# Patient Record
Sex: FEMALE | Race: WHITE | NOT HISPANIC OR LATINO
[De-identification: names, ages, dates, MRNs, and addresses within clinical notes are randomized per-mention and may not be internally consistent; named-entity substitution may affect disease eponyms.]

---

## 2017-04-10 PROBLEM — Z00.00 ENCOUNTER FOR PREVENTIVE HEALTH EXAMINATION: Status: ACTIVE | Noted: 2017-04-10

## 2017-05-02 ENCOUNTER — APPOINTMENT (OUTPATIENT)
Dept: GASTROENTEROLOGY | Facility: CLINIC | Age: 52
End: 2017-05-02

## 2017-05-02 VITALS
BODY MASS INDEX: 38.65 KG/M2 | TEMPERATURE: 97.9 F | DIASTOLIC BLOOD PRESSURE: 100 MMHG | SYSTOLIC BLOOD PRESSURE: 140 MMHG | HEIGHT: 70 IN | WEIGHT: 270 LBS

## 2017-05-02 DIAGNOSIS — K30 FUNCTIONAL DYSPEPSIA: ICD-10-CM

## 2017-05-02 DIAGNOSIS — E78.00 PURE HYPERCHOLESTEROLEMIA, UNSPECIFIED: ICD-10-CM

## 2017-05-02 DIAGNOSIS — Z86.39 PERSONAL HISTORY OF OTHER ENDOCRINE, NUTRITIONAL AND METABOLIC DISEASE: ICD-10-CM

## 2017-05-02 DIAGNOSIS — K92.0 HEMATEMESIS: ICD-10-CM

## 2017-05-02 DIAGNOSIS — Z82.3 FAMILY HISTORY OF STROKE: ICD-10-CM

## 2017-05-02 DIAGNOSIS — Z87.09 PERSONAL HISTORY OF OTHER DISEASES OF THE RESPIRATORY SYSTEM: ICD-10-CM

## 2017-05-02 DIAGNOSIS — R11.10 VOMITING, UNSPECIFIED: ICD-10-CM

## 2017-05-02 DIAGNOSIS — Z86.79 PERSONAL HISTORY OF OTHER DISEASES OF THE CIRCULATORY SYSTEM: ICD-10-CM

## 2017-05-02 DIAGNOSIS — F15.90 OTHER STIMULANT USE, UNSPECIFIED, UNCOMPLICATED: ICD-10-CM

## 2017-05-02 DIAGNOSIS — Z82.49 FAMILY HISTORY OF ISCHEMIC HEART DISEASE AND OTHER DISEASES OF THE CIRCULATORY SYSTEM: ICD-10-CM

## 2017-07-14 ENCOUNTER — APPOINTMENT (OUTPATIENT)
Dept: GASTROENTEROLOGY | Facility: AMBULATORY MEDICAL SERVICES | Age: 52
End: 2017-07-14

## 2017-07-28 ENCOUNTER — APPOINTMENT (OUTPATIENT)
Dept: GASTROENTEROLOGY | Facility: AMBULATORY MEDICAL SERVICES | Age: 52
End: 2017-07-28
Payer: COMMERCIAL

## 2017-07-28 ENCOUNTER — OTHER (OUTPATIENT)
Age: 52
End: 2017-07-28

## 2017-07-28 PROCEDURE — 43239 EGD BIOPSY SINGLE/MULTIPLE: CPT | Mod: 59

## 2017-07-28 PROCEDURE — G0121 COLON CA SCRN NOT HI RSK IND: CPT

## 2017-08-08 ENCOUNTER — OTHER (OUTPATIENT)
Age: 52
End: 2017-08-08

## 2017-08-11 ENCOUNTER — APPOINTMENT (OUTPATIENT)
Dept: GASTROENTEROLOGY | Facility: CLINIC | Age: 52
End: 2017-08-11
Payer: COMMERCIAL

## 2017-08-29 ENCOUNTER — APPOINTMENT (OUTPATIENT)
Dept: GASTROENTEROLOGY | Facility: CLINIC | Age: 52
End: 2017-08-29
Payer: COMMERCIAL

## 2017-08-29 VITALS
HEIGHT: 70 IN | WEIGHT: 260 LBS | DIASTOLIC BLOOD PRESSURE: 95 MMHG | SYSTOLIC BLOOD PRESSURE: 130 MMHG | BODY MASS INDEX: 37.22 KG/M2 | TEMPERATURE: 98.1 F

## 2017-08-29 PROCEDURE — 99213 OFFICE O/P EST LOW 20 MIN: CPT

## 2017-10-27 ENCOUNTER — RX RENEWAL (OUTPATIENT)
Age: 52
End: 2017-10-27

## 2017-10-29 ENCOUNTER — RX RENEWAL (OUTPATIENT)
Age: 52
End: 2017-10-29

## 2017-11-16 ENCOUNTER — APPOINTMENT (OUTPATIENT)
Dept: GASTROENTEROLOGY | Facility: AMBULATORY MEDICAL SERVICES | Age: 52
End: 2017-11-16
Payer: COMMERCIAL

## 2017-11-16 PROCEDURE — 43239 EGD BIOPSY SINGLE/MULTIPLE: CPT

## 2017-12-05 ENCOUNTER — APPOINTMENT (OUTPATIENT)
Dept: GASTROENTEROLOGY | Facility: CLINIC | Age: 52
End: 2017-12-05

## 2017-12-11 ENCOUNTER — RX RENEWAL (OUTPATIENT)
Age: 52
End: 2017-12-11

## 2017-12-18 ENCOUNTER — APPOINTMENT (OUTPATIENT)
Dept: GASTROENTEROLOGY | Facility: CLINIC | Age: 52
End: 2017-12-18
Payer: COMMERCIAL

## 2017-12-18 VITALS
TEMPERATURE: 98.3 F | DIASTOLIC BLOOD PRESSURE: 100 MMHG | HEIGHT: 70 IN | BODY MASS INDEX: 41.95 KG/M2 | WEIGHT: 293 LBS | SYSTOLIC BLOOD PRESSURE: 140 MMHG

## 2017-12-18 DIAGNOSIS — Z71.89 OTHER SPECIFIED COUNSELING: ICD-10-CM

## 2017-12-18 PROCEDURE — 99214 OFFICE O/P EST MOD 30 MIN: CPT

## 2017-12-29 ENCOUNTER — RX RENEWAL (OUTPATIENT)
Age: 52
End: 2017-12-29

## 2018-01-10 ENCOUNTER — RX RENEWAL (OUTPATIENT)
Age: 53
End: 2018-01-10

## 2018-04-03 ENCOUNTER — APPOINTMENT (OUTPATIENT)
Dept: ULTRASOUND IMAGING | Facility: CLINIC | Age: 53
End: 2018-04-03

## 2018-04-03 ENCOUNTER — OUTPATIENT (OUTPATIENT)
Dept: OUTPATIENT SERVICES | Facility: HOSPITAL | Age: 53
LOS: 1 days | End: 2018-04-03
Payer: COMMERCIAL

## 2018-04-03 DIAGNOSIS — Z00.8 ENCOUNTER FOR OTHER GENERAL EXAMINATION: ICD-10-CM

## 2018-04-03 PROCEDURE — 20611 DRAIN/INJ JOINT/BURSA W/US: CPT

## 2018-04-03 PROCEDURE — 20611 DRAIN/INJ JOINT/BURSA W/US: CPT | Mod: LT

## 2018-04-12 ENCOUNTER — APPOINTMENT (OUTPATIENT)
Dept: GASTROENTEROLOGY | Facility: CLINIC | Age: 53
End: 2018-04-12
Payer: COMMERCIAL

## 2018-04-12 VITALS
TEMPERATURE: 98 F | HEIGHT: 70 IN | WEIGHT: 256 LBS | BODY MASS INDEX: 36.65 KG/M2 | SYSTOLIC BLOOD PRESSURE: 130 MMHG | DIASTOLIC BLOOD PRESSURE: 100 MMHG

## 2018-04-12 DIAGNOSIS — R12 HEARTBURN: ICD-10-CM

## 2018-04-12 DIAGNOSIS — Z09 ENCOUNTER FOR FOLLOW-UP EXAMINATION AFTER COMPLETED TREATMENT FOR CONDITIONS OTHER THAN MALIGNANT NEOPLASM: ICD-10-CM

## 2018-04-12 PROCEDURE — 99213 OFFICE O/P EST LOW 20 MIN: CPT

## 2018-06-18 ENCOUNTER — RX RENEWAL (OUTPATIENT)
Age: 53
End: 2018-06-18

## 2019-01-07 ENCOUNTER — RX RENEWAL (OUTPATIENT)
Age: 54
End: 2019-01-07

## 2019-09-02 PROBLEM — Z09 FOLLOW UP: Status: ACTIVE | Noted: 2018-04-12

## 2019-09-24 ENCOUNTER — APPOINTMENT (OUTPATIENT)
Dept: GASTROENTEROLOGY | Facility: CLINIC | Age: 54
End: 2019-09-24
Payer: COMMERCIAL

## 2019-09-24 VITALS
OXYGEN SATURATION: 98 % | WEIGHT: 280 LBS | HEIGHT: 70 IN | TEMPERATURE: 98.2 F | SYSTOLIC BLOOD PRESSURE: 130 MMHG | HEART RATE: 84 BPM | BODY MASS INDEX: 40.09 KG/M2 | DIASTOLIC BLOOD PRESSURE: 90 MMHG

## 2019-09-24 DIAGNOSIS — K21.0 DIAPHRAGMATIC HERNIA W/OUT OBSTRUCTION OR GANGRENE: ICD-10-CM

## 2019-09-24 DIAGNOSIS — Z87.19 PERSONAL HISTORY OF OTHER DISEASES OF THE DIGESTIVE SYSTEM: ICD-10-CM

## 2019-09-24 DIAGNOSIS — E66.9 OBESITY, UNSPECIFIED: ICD-10-CM

## 2019-09-24 DIAGNOSIS — R93.41 ABNORMAL RADIOLOGIC FINDINGS ON DIAGNOSTIC IMAGING OF OF RENAL PELVIS, URETER, OR BLADDER: ICD-10-CM

## 2019-09-24 DIAGNOSIS — N39.0 URINARY TRACT INFECTION, SITE NOT SPECIFIED: ICD-10-CM

## 2019-09-24 DIAGNOSIS — K44.9 DIAPHRAGMATIC HERNIA W/OUT OBSTRUCTION OR GANGRENE: ICD-10-CM

## 2019-09-24 DIAGNOSIS — K21.9 GASTRO-ESOPHAGEAL REFLUX DISEASE W/OUT ESOPHAGITIS: ICD-10-CM

## 2019-09-24 PROCEDURE — 99214 OFFICE O/P EST MOD 30 MIN: CPT

## 2019-09-24 NOTE — PHYSICAL EXAM
[General Appearance - Alert] : alert [General Appearance - In No Acute Distress] : in no acute distress [Sclera] : the sclera and conjunctiva were normal [PERRL With Normal Accommodation] : pupils were equal in size, round, and reactive to light [Extraocular Movements] : extraocular movements were intact [Outer Ear] : the ears and nose were normal in appearance [Oropharynx] : the oropharynx was normal [Neck Appearance] : the appearance of the neck was normal [Neck Cervical Mass (___cm)] : no neck mass was observed [Thyroid Diffuse Enlargement] : the thyroid was not enlarged [Jugular Venous Distention Increased] : there was no jugular-venous distention [Thyroid Nodule] : there were no palpable thyroid nodules [Auscultation Breath Sounds / Voice Sounds] : lungs were clear to auscultation bilaterally [Heart Sounds] : normal S1 and S2 [Heart Rate And Rhythm] : heart rate was normal and rhythm regular [Heart Sounds Gallop] : no gallops [Bowel Sounds] : normal bowel sounds [Heart Sounds Pericardial Friction Rub] : no pericardial rub [Murmurs] : no murmurs [Abdomen Tenderness] : non-tender [Abdomen Soft] : soft [] : no hepato-splenomegaly [No CVA Tenderness] : no ~M costovertebral angle tenderness [Abdomen Mass (___ Cm)] : no abdominal mass palpated [Abnormal Walk] : normal gait [No Spinal Tenderness] : no spinal tenderness [Nail Clubbing] : no clubbing  or cyanosis of the fingernails [Motor Tone] : muscle strength and tone were normal [Musculoskeletal - Swelling] : no joint swelling seen [Impaired Insight] : insight and judgment were intact [Oriented To Time, Place, And Person] : oriented to person, place, and time [Affect] : the affect was normal

## 2019-09-24 NOTE — HISTORY OF PRESENT ILLNESS
[FreeTextEntry1] : Patient is a 54-year-old female with history of GERD. She had an upper endoscopy in 2017 that revealed a hiatus hernia and patulous LES. She had regained the weight she had lost. She regained about 50 pounds and her GERD symptoms recurred. She is having heartburn and regurgitation. This is occurring postprandially and when she is lying down. This is associated with occasional vomiting. She took over-the-counter Nexium with some relief of her symptoms but only for a short period of time. \par Patient had recurrent UTIs at the end of June. Abdominal sonogram revealed a possible kidney mass. This was not confirmed by CAT scan but she did have evidence of a in the bladder. She denies any instrumentation. She finished a course of Macrobid today and feels better. She had a colonoscopy in 2017 that revealed multiple diverticula in the sigmoid colon.

## 2019-09-24 NOTE — ASSESSMENT
[FreeTextEntry1] : Patient with recurrent GERD symptoms. She does have a history of a hiatus hernia, patulous LES, and esophagitis. She will continue famotidine 40 mg b.i.d. She has recently gained weight and will try and lose.\par She also had an abnormal finding on CT scan with small amount of air in the urinary bladder. She does have a history of diverticulosis but no symptoms of diverticulitis. A urinalysis will be ordered.

## 2019-11-12 ENCOUNTER — APPOINTMENT (OUTPATIENT)
Dept: GASTROENTEROLOGY | Facility: CLINIC | Age: 54
End: 2019-11-12

## 2019-12-12 ENCOUNTER — RX RENEWAL (OUTPATIENT)
Age: 54
End: 2019-12-12

## 2019-12-15 ENCOUNTER — RX RENEWAL (OUTPATIENT)
Age: 54
End: 2019-12-15

## 2019-12-15 RX ORDER — FAMOTIDINE 40 MG/1
40 TABLET, FILM COATED ORAL
Qty: 60 | Refills: 0 | Status: ACTIVE | COMMUNITY
Start: 2017-10-29 | End: 1900-01-01

## 2020-03-24 ENCOUNTER — EMERGENCY (EMERGENCY)
Facility: HOSPITAL | Age: 55
LOS: 0 days | Discharge: HOME | End: 2020-03-25
Attending: EMERGENCY MEDICINE
Payer: COMMERCIAL

## 2020-03-24 VITALS
HEART RATE: 85 BPM | RESPIRATION RATE: 22 BRPM | HEIGHT: 70 IN | SYSTOLIC BLOOD PRESSURE: 159 MMHG | WEIGHT: 274.92 LBS | TEMPERATURE: 98 F | DIASTOLIC BLOOD PRESSURE: 93 MMHG

## 2020-03-24 DIAGNOSIS — R06.02 SHORTNESS OF BREATH: ICD-10-CM

## 2020-03-24 DIAGNOSIS — J18.9 PNEUMONIA, UNSPECIFIED ORGANISM: ICD-10-CM

## 2020-03-24 DIAGNOSIS — J45.41 MODERATE PERSISTENT ASTHMA WITH (ACUTE) EXACERBATION: ICD-10-CM

## 2020-03-24 DIAGNOSIS — B34.9 VIRAL INFECTION, UNSPECIFIED: ICD-10-CM

## 2020-03-24 LAB
FLU A RESULT: SIGNIFICANT CHANGE UP
FLU A RESULT: SIGNIFICANT CHANGE UP
FLUAV AG NPH QL: SIGNIFICANT CHANGE UP
FLUBV AG NPH QL: SIGNIFICANT CHANGE UP
RSV RESULT: SIGNIFICANT CHANGE UP
RSV RNA RESP QL NAA+PROBE: SIGNIFICANT CHANGE UP

## 2020-03-24 PROCEDURE — 99284 EMERGENCY DEPT VISIT MOD MDM: CPT

## 2020-03-24 PROCEDURE — 93010 ELECTROCARDIOGRAM REPORT: CPT

## 2020-03-24 RX ORDER — ALBUTEROL 90 UG/1
1 AEROSOL, METERED ORAL ONCE
Refills: 0 | Status: COMPLETED | OUTPATIENT
Start: 2020-03-24 | End: 2020-03-24

## 2020-03-24 RX ORDER — IPRATROPIUM/ALBUTEROL SULFATE 18-103MCG
3 AEROSOL WITH ADAPTER (GRAM) INHALATION
Refills: 0 | Status: DISCONTINUED | OUTPATIENT
Start: 2020-03-24 | End: 2020-03-24

## 2020-03-24 RX ADMIN — Medication 100 MILLIGRAM(S): at 22:13

## 2020-03-24 RX ADMIN — Medication 60 MILLIGRAM(S): at 22:13

## 2020-03-24 RX ADMIN — ALBUTEROL 1 PUFF(S): 90 AEROSOL, METERED ORAL at 22:13

## 2020-03-24 NOTE — ED ADULT TRIAGE NOTE - CHIEF COMPLAINT QUOTE
hx of asthma PW with SOB  since Thursday given albuterol had a  - chest x ray by Urgent care Saturday, however reports inhaler ineffective. Additionally pt reports several coworkers tested + COVID -19. denies recent travels, chest pain., chills and fever.

## 2020-03-24 NOTE — ED PROVIDER NOTE - OBJECTIVE STATEMENT
55 yo F with 1 week of sob, went to PCP, diagnosed with viral syndrome/asthma exacerbation, + covid + contacts.  + low grade fever, cough, sob at rest, no cp.  No other complaints or inciting event or associated symptoms.   ROS: negative for headache, chest pain, shortness of breath, abd pain, nausea, vomiting, diarrhea, rash, paresthesia, and focal weakness--all other systems reviewed are negative.   PMH: asthma; Meds: albuterol prn; SH: Denies smoking/drinking/drug use

## 2020-03-24 NOTE — ED PROVIDER NOTE - CARE PLAN
Principal Discharge DX:	Moderate persistent asthma with exacerbation  Secondary Diagnosis:	Viral syndrome Principal Discharge DX:	Moderate persistent asthma with exacerbation  Secondary Diagnosis:	Viral syndrome  Secondary Diagnosis:	Pneumonia of left lower lobe due to infectious organism

## 2020-03-24 NOTE — ED PROVIDER NOTE - PHYSICAL EXAMINATION
Vitals: HTN at 159/93, otherwise WNL  Gen: AAOx3, NAD, sitting comfortably in chair, non-toxic   Head: ncat, perrla, eomi b/l  Neck: supple, no lymphadenopathy, no midline deviation  Heart: rrr, no m/r/g  Lungs: mild expiratory wheezing diffusely   Abd: soft, nontender, non-distended, no rebound or guarding  Ext: no clubbing/cyanosis/edema  Neuro: sensation and muscle strength intact b/l, steady gait

## 2020-03-24 NOTE — ED PROVIDER NOTE - PATIENT PORTAL LINK FT
You can access the FollowMyHealth Patient Portal offered by [facility] by registering at the following website: [email or web address]. By joining [employer]’s FollowMyHealth portal, you will also be able to view your health information using other applications (apps) compatible with our system.

## 2020-03-24 NOTE — ED PROVIDER NOTE - CARE PROVIDER_API CALL
Beck Schroeder)  Medicine  [address], [address]  [place] [address]  Phone: [phone]  Fax: [phone]  Follow Up Time: 4-6 Days

## 2020-03-24 NOTE — ED ADULT NURSE NOTE - OBJECTIVE STATEMENT
53 yo F with 1 week of sob, went to PCP, diagnosed with viral syndrome/asthma exacerbation, + covid + contacts.  + low grade fever, cough, sob at rest, no cp.  No other complaints or inciting event or associated symptoms.

## 2020-03-24 NOTE — ED PROVIDER NOTE - PROGRESS NOTE DETAILS
Results reported to patient--grossly benign, labs unremarkable, XR shows ? LLL infiltrate  Pt. reports feeling better after meds, sob improved   pt. agrees to f/u with primary care outpt. asap, referred to pulm for f/u   pt. understands to return to ED if symptoms worsen; will d/c with meds

## 2020-03-24 NOTE — ED PROVIDER NOTE - CLINICAL SUMMARY MEDICAL DECISION MAKING FREE TEXT BOX
53 yo F with sob, likely asthma incited by viral syndrome, possible covid  -flu/rvp, cxr, ekg, prednisone, combinebs, tessalon for cough  -f/u results, reeval

## 2020-03-25 VITALS
OXYGEN SATURATION: 97 % | RESPIRATION RATE: 18 BRPM | TEMPERATURE: 97 F | SYSTOLIC BLOOD PRESSURE: 154 MMHG | HEART RATE: 85 BPM | DIASTOLIC BLOOD PRESSURE: 95 MMHG

## 2020-03-25 PROCEDURE — 71045 X-RAY EXAM CHEST 1 VIEW: CPT | Mod: 26

## 2020-03-25 RX ORDER — ACETAMINOPHEN 500 MG
2 TABLET ORAL
Qty: 40 | Refills: 0
Start: 2020-03-25 | End: 2020-03-29

## 2020-03-25 RX ORDER — IBUPROFEN 200 MG
1 TABLET ORAL
Qty: 20 | Refills: 0
Start: 2020-03-25 | End: 2020-03-29

## 2020-03-25 RX ORDER — AZITHROMYCIN 500 MG/1
1 TABLET, FILM COATED ORAL
Qty: 4 | Refills: 0
Start: 2020-03-25 | End: 2020-03-28

## 2020-03-25 RX ORDER — ALBUTEROL 90 UG/1
2 AEROSOL, METERED ORAL
Qty: 1 | Refills: 0
Start: 2020-03-25 | End: 2020-04-23

## 2020-03-27 LAB — RAPID RVP RESULT: SIGNIFICANT CHANGE UP

## 2021-07-17 ENCOUNTER — EMERGENCY (EMERGENCY)
Facility: HOSPITAL | Age: 56
LOS: 0 days | Discharge: LEFT BEFORE DISCHARGE | End: 2021-07-17
Attending: EMERGENCY MEDICINE
Payer: COMMERCIAL

## 2021-07-17 ENCOUNTER — INPATIENT (INPATIENT)
Facility: HOSPITAL | Age: 56
LOS: 5 days | Discharge: HOME | End: 2021-07-23
Attending: INTERNAL MEDICINE | Admitting: INTERNAL MEDICINE
Payer: COMMERCIAL

## 2021-07-17 VITALS
SYSTOLIC BLOOD PRESSURE: 162 MMHG | TEMPERATURE: 98 F | OXYGEN SATURATION: 100 % | WEIGHT: 261.91 LBS | DIASTOLIC BLOOD PRESSURE: 84 MMHG | RESPIRATION RATE: 17 BRPM | HEART RATE: 90 BPM | HEIGHT: 70 IN

## 2021-07-17 VITALS
RESPIRATION RATE: 18 BRPM | TEMPERATURE: 98 F | OXYGEN SATURATION: 100 % | HEART RATE: 84 BPM | DIASTOLIC BLOOD PRESSURE: 81 MMHG | SYSTOLIC BLOOD PRESSURE: 125 MMHG

## 2021-07-17 VITALS
HEIGHT: 70 IN | TEMPERATURE: 98 F | RESPIRATION RATE: 17 BRPM | SYSTOLIC BLOOD PRESSURE: 156 MMHG | OXYGEN SATURATION: 99 % | HEART RATE: 114 BPM | WEIGHT: 214.95 LBS | DIASTOLIC BLOOD PRESSURE: 84 MMHG

## 2021-07-17 DIAGNOSIS — I63.9 CEREBRAL INFARCTION, UNSPECIFIED: ICD-10-CM

## 2021-07-17 DIAGNOSIS — G43.909 MIGRAINE, UNSPECIFIED, NOT INTRACTABLE, WITHOUT STATUS MIGRAINOSUS: ICD-10-CM

## 2021-07-17 LAB
ALBUMIN SERPL ELPH-MCNC: 3.6 G/DL — SIGNIFICANT CHANGE UP (ref 3.3–5)
ALP SERPL-CCNC: 86 U/L — SIGNIFICANT CHANGE UP (ref 40–120)
ALT FLD-CCNC: 39 U/L — SIGNIFICANT CHANGE UP (ref 12–78)
ANION GAP SERPL CALC-SCNC: 9 MMOL/L — SIGNIFICANT CHANGE UP (ref 5–17)
APTT BLD: 30.7 SEC — SIGNIFICANT CHANGE UP (ref 27.5–35.5)
AST SERPL-CCNC: 15 U/L — SIGNIFICANT CHANGE UP (ref 15–37)
BASOPHILS # BLD AUTO: 0.02 K/UL — SIGNIFICANT CHANGE UP (ref 0–0.2)
BASOPHILS NFR BLD AUTO: 0.3 % — SIGNIFICANT CHANGE UP (ref 0–2)
BILIRUB SERPL-MCNC: 0.3 MG/DL — SIGNIFICANT CHANGE UP (ref 0.2–1.2)
BUN SERPL-MCNC: 19 MG/DL — SIGNIFICANT CHANGE UP (ref 7–23)
CALCIUM SERPL-MCNC: 9.7 MG/DL — SIGNIFICANT CHANGE UP (ref 8.5–10.1)
CHLORIDE SERPL-SCNC: 106 MMOL/L — SIGNIFICANT CHANGE UP (ref 96–108)
CO2 SERPL-SCNC: 23 MMOL/L — SIGNIFICANT CHANGE UP (ref 22–31)
CREAT SERPL-MCNC: 1.08 MG/DL — SIGNIFICANT CHANGE UP (ref 0.5–1.3)
EOSINOPHIL # BLD AUTO: 0.03 K/UL — SIGNIFICANT CHANGE UP (ref 0–0.5)
EOSINOPHIL NFR BLD AUTO: 0.5 % — SIGNIFICANT CHANGE UP (ref 0–6)
FLUAV AG NPH QL: SIGNIFICANT CHANGE UP
FLUBV AG NPH QL: SIGNIFICANT CHANGE UP
GLUCOSE BLDC GLUCOMTR-MCNC: 106 MG/DL — HIGH (ref 70–99)
GLUCOSE SERPL-MCNC: 99 MG/DL — SIGNIFICANT CHANGE UP (ref 70–99)
HCG SERPL-ACNC: 5 MIU/ML — SIGNIFICANT CHANGE UP
HCT VFR BLD CALC: 37.7 % — SIGNIFICANT CHANGE UP (ref 34.5–45)
HGB BLD-MCNC: 12.2 G/DL — SIGNIFICANT CHANGE UP (ref 11.5–15.5)
IMM GRANULOCYTES NFR BLD AUTO: 0.3 % — SIGNIFICANT CHANGE UP (ref 0–1.5)
INR BLD: 1.02 RATIO — SIGNIFICANT CHANGE UP (ref 0.88–1.16)
LYMPHOCYTES # BLD AUTO: 1.86 K/UL — SIGNIFICANT CHANGE UP (ref 1–3.3)
LYMPHOCYTES # BLD AUTO: 28.5 % — SIGNIFICANT CHANGE UP (ref 13–44)
MCHC RBC-ENTMCNC: 27.7 PG — SIGNIFICANT CHANGE UP (ref 27–34)
MCHC RBC-ENTMCNC: 32.4 GM/DL — SIGNIFICANT CHANGE UP (ref 32–36)
MCV RBC AUTO: 85.5 FL — SIGNIFICANT CHANGE UP (ref 80–100)
MONOCYTES # BLD AUTO: 0.47 K/UL — SIGNIFICANT CHANGE UP (ref 0–0.9)
MONOCYTES NFR BLD AUTO: 7.2 % — SIGNIFICANT CHANGE UP (ref 2–14)
NEUTROPHILS # BLD AUTO: 4.12 K/UL — SIGNIFICANT CHANGE UP (ref 1.8–7.4)
NEUTROPHILS NFR BLD AUTO: 63.2 % — SIGNIFICANT CHANGE UP (ref 43–77)
NRBC # BLD: 0 /100 WBCS — SIGNIFICANT CHANGE UP (ref 0–0)
NT-PROBNP SERPL-SCNC: 70 PG/ML — SIGNIFICANT CHANGE UP (ref 0–125)
PLATELET # BLD AUTO: 290 K/UL — SIGNIFICANT CHANGE UP (ref 150–400)
POTASSIUM SERPL-MCNC: 4.1 MMOL/L — SIGNIFICANT CHANGE UP (ref 3.5–5.3)
POTASSIUM SERPL-SCNC: 4.1 MMOL/L — SIGNIFICANT CHANGE UP (ref 3.5–5.3)
PROT SERPL-MCNC: 8.5 GM/DL — HIGH (ref 6–8.3)
PROTHROM AB SERPL-ACNC: 11.8 SEC — SIGNIFICANT CHANGE UP (ref 10.6–13.6)
RBC # BLD: 4.41 M/UL — SIGNIFICANT CHANGE UP (ref 3.8–5.2)
RBC # FLD: 13.4 % — SIGNIFICANT CHANGE UP (ref 10.3–14.5)
SARS-COV-2 RNA SPEC QL NAA+PROBE: SIGNIFICANT CHANGE UP
SODIUM SERPL-SCNC: 138 MMOL/L — SIGNIFICANT CHANGE UP (ref 135–145)
WBC # BLD: 6.52 K/UL — SIGNIFICANT CHANGE UP (ref 3.8–10.5)
WBC # FLD AUTO: 6.52 K/UL — SIGNIFICANT CHANGE UP (ref 3.8–10.5)

## 2021-07-17 PROCEDURE — 70450 CT HEAD/BRAIN W/O DYE: CPT | Mod: 26,MA

## 2021-07-17 PROCEDURE — 99285 EMERGENCY DEPT VISIT HI MDM: CPT

## 2021-07-17 PROCEDURE — 71045 X-RAY EXAM CHEST 1 VIEW: CPT | Mod: 26

## 2021-07-17 PROCEDURE — 99222 1ST HOSP IP/OBS MODERATE 55: CPT

## 2021-07-17 PROCEDURE — 99284 EMERGENCY DEPT VISIT MOD MDM: CPT

## 2021-07-17 PROCEDURE — 70486 CT MAXILLOFACIAL W/O DYE: CPT | Mod: 26,MA

## 2021-07-17 PROCEDURE — 93010 ELECTROCARDIOGRAM REPORT: CPT

## 2021-07-17 RX ORDER — HEPARIN SODIUM 5000 [USP'U]/ML
5000 INJECTION INTRAVENOUS; SUBCUTANEOUS EVERY 12 HOURS
Refills: 0 | Status: DISCONTINUED | OUTPATIENT
Start: 2021-07-17 | End: 2021-07-22

## 2021-07-17 RX ORDER — SODIUM CHLORIDE 9 MG/ML
3 INJECTION INTRAMUSCULAR; INTRAVENOUS; SUBCUTANEOUS ONCE
Refills: 0 | Status: COMPLETED | OUTPATIENT
Start: 2021-07-17 | End: 2021-07-17

## 2021-07-17 RX ORDER — DIPHENHYDRAMINE HCL 50 MG
25 CAPSULE ORAL ONCE
Refills: 0 | Status: COMPLETED | OUTPATIENT
Start: 2021-07-17 | End: 2021-07-17

## 2021-07-17 RX ORDER — ACETAMINOPHEN 500 MG
650 TABLET ORAL EVERY 6 HOURS
Refills: 0 | Status: DISCONTINUED | OUTPATIENT
Start: 2021-07-17 | End: 2021-07-21

## 2021-07-17 RX ORDER — ATORVASTATIN CALCIUM 80 MG/1
80 TABLET, FILM COATED ORAL AT BEDTIME
Refills: 0 | Status: DISCONTINUED | OUTPATIENT
Start: 2021-07-17 | End: 2021-07-23

## 2021-07-17 RX ORDER — ASPIRIN/CALCIUM CARB/MAGNESIUM 324 MG
325 TABLET ORAL ONCE
Refills: 0 | Status: COMPLETED | OUTPATIENT
Start: 2021-07-17 | End: 2021-07-17

## 2021-07-17 RX ORDER — ASPIRIN/CALCIUM CARB/MAGNESIUM 324 MG
81 TABLET ORAL DAILY
Refills: 0 | Status: DISCONTINUED | OUTPATIENT
Start: 2021-07-17 | End: 2021-07-23

## 2021-07-17 RX ORDER — METOCLOPRAMIDE HCL 10 MG
10 TABLET ORAL ONCE
Refills: 0 | Status: COMPLETED | OUTPATIENT
Start: 2021-07-17 | End: 2021-07-17

## 2021-07-17 RX ORDER — ACETAMINOPHEN 500 MG
975 TABLET ORAL ONCE
Refills: 0 | Status: COMPLETED | OUTPATIENT
Start: 2021-07-17 | End: 2021-07-17

## 2021-07-17 RX ADMIN — Medication 10 MILLIGRAM(S): at 15:49

## 2021-07-17 RX ADMIN — Medication 25 MILLIGRAM(S): at 22:07

## 2021-07-17 RX ADMIN — Medication 975 MILLIGRAM(S): at 16:46

## 2021-07-17 RX ADMIN — Medication 975 MILLIGRAM(S): at 15:49

## 2021-07-17 RX ADMIN — Medication 325 MILLIGRAM(S): at 22:07

## 2021-07-17 RX ADMIN — Medication 10 MILLIGRAM(S): at 22:07

## 2021-07-17 RX ADMIN — SODIUM CHLORIDE 3 MILLILITER(S): 9 INJECTION INTRAMUSCULAR; INTRAVENOUS; SUBCUTANEOUS at 22:16

## 2021-07-17 NOTE — H&P ADULT - NSHPLABSRESULTS_GEN_ALL_CORE
LABS:                        12.2   6.52  )-----------( 290      ( 17 Jul 2021 16:48 )             37.7     07-17    138  |  106  |  19  ----------------------------<  99  4.1   |  23  |  1.08    Ca    9.7      17 Jul 2021 16:48    TPro  8.5<H>  /  Alb  3.6  /  TBili  0.3  /  DBili  x   /  AST  15  /  ALT  39  /  AlkPhos  86  07-17    PT/INR - ( 17 Jul 2021 16:48 )   PT: 11.8 sec;   INR: 1.02 ratio         PTT - ( 17 Jul 2021 16:48 )  PTT:30.7 sec

## 2021-07-17 NOTE — ED PROVIDER NOTE - NEUROLOGICAL, MLM
Alert and oriented, no focal deficits, no motor or sensory deficits. no dysmetria, normal finger to nose

## 2021-07-17 NOTE — ED PROVIDER NOTE - PATIENT PORTAL LINK FT
You can access the FollowMyHealth Patient Portal offered by [facility] by registering at the following website: [email or web address]. By joining [employer]’s FollowMyHealth portal, you will also be able to view your health information using other applications (apps) compatible with our system.

## 2021-07-17 NOTE — H&P ADULT - ASSESSMENT
57 y/o F     with PMH migraines,               Pt  c/o  right  occipital and rt temporal headache x 6 days, and noted she had trouble findings her words.        Pt reports the trouble findings words improved, but still present mildly.        Pt was seen earlier this morning  by Er  and CT ,showed  Lt temporal and occipital CVA and pt signed out AMA.       pt now returns  to er  for admission         *  s/p headaches/  word  finding  difficulty        Ct head, acute to  sub acute  infarct, in left temporal lobe/ left  occipital  lobe          Neuro   called by  er          on asa/  lipitor 80 mg           Echo ordered        MRI brain ordered         on dvt ppx/ s/q  heparin         labs/ lipid  profile  in am     ra< from: CT Head No Cont (07.17.21 @ 16:21) >  MPRESSION:  HEAD CT: Acute or subacute infarct suspected along mid to posterior left temporal lobe and possibly also upper left occipital lobe.  Discussed with Dr. Sauceda in the ED at 4:34 PM. MRI may provide helpful additional evaluation, if clinically indicated.  --- End of Report ---  < end of copied text >            	57 y/o F     with PMH migraines,               Pt  c/o  right  occipital and rt temporal headache x 6 days, and noted she had trouble findings her words.        Pt reports the trouble findings words improved, but still present mildly.        Pt was seen earlier this morning  by Er  and CT ,showed  Lt temporal and occipital CVA and pt signed out AMA.       pt now returns  to er  for admission         *  s/p headaches/  word  finding  difficulty        Ct head, acute to  sub acute  infarct, in left temporal lobe/ left  occipital  lobe          Neuro   called by  er          on asa/  lipitor 80 mg             Echo ordered           MRI brain ordered    *   bmi  is  37             on dvt ppx/ s/q  heparin         labs/ lipid  profile  in am     ra< from: CT Head No Cont (07.17.21 @ 16:21) >  MPRESSION:  HEAD CT: Acute or subacute infarct suspected along mid to posterior left temporal lobe and possibly also upper left occipital lobe.  Discussed with Dr. Sauceda in the ED at 4:34 PM. MRI may provide helpful additional evaluation, if clinically indicated.  --- End of Report ---  < end of copied text >            	57 y/o F     with PMH migraines,               Pt  c/o  right  occipital and rt temporal headache x 6 days, and noted she had trouble findings her words.        Pt reports the trouble findings words improved, but still present mildly.        Pt was seen earlier this morning  by Er  and CT ,showed  Lt temporal and occipital CVA and pt signed out AMA., to  take  care of  he r pets/ 9  cats/  1  dog       pt now returns  to er  for admission         *  s/p headaches/  word  finding  difficulty, all have  resolbed        Ct head, acute to  sub acute  infarct, in left temporal lobe/ left  occipital  lobe          Neuro   called by  er          on asa/  lipitor 80 mg             Echo ordered           MRI brain ordered    *   bmi  is  37             on dvt ppx/ s/q  heparin         labs/ lipid  profile  in am     ra< from: CT Head No Cont (07.17.21 @ 16:21) >  MPRESSION:  HEAD CT: Acute or subacute infarct suspected along mid to posterior left temporal lobe and possibly also upper left occipital lobe.  Discussed with Dr. Sauceda in the ED at 4:34 PM. MRI may provide helpful additional evaluation, if clinically indicated.  --- End of Report ---  < end of copied text >

## 2021-07-17 NOTE — ED ADULT NURSE NOTE - OBJECTIVE STATEMENT
received er bed 1 c/o migraine headache since last sunday with nausea vomited x 1 on monday states feels difficulty finding right words at times since monday a&ox3 denies dizziness denies any focal numbness or weakness noted no facial asymmetry speech clear moves all extremities with = strength and coordination denies dizziness denies photosensitivity c/o sinus pain/pressure and congestion

## 2021-07-17 NOTE — H&P ADULT - NSHPPHYSICALEXAM_GEN_ALL_CORE
PHYSICAL EXAMINATION:  Vital Signs Last 24 Hrs  T(C): 36.7 (17 Jul 2021 20:44), Max: 36.7 (17 Jul 2021 12:36)  T(F): 98 (17 Jul 2021 20:44), Max: 98.1 (17 Jul 2021 12:36)  HR: 90 (17 Jul 2021 20:44) (84 - 114)  BP: 162/84 (17 Jul 2021 20:44) (125/81 - 162/84)  BP(mean): --  RR: 17 (17 Jul 2021 20:44) (17 - 18)  SpO2: 100% (17 Jul 2021 20:44) (99% - 100%)  CAPILLARY BLOOD GLUCOSE      POCT Blood Glucose.: 106 mg/dL (17 Jul 2021 20:49)        GENERAL: NAD, well-groomed,  HEAD:  atraumatic, normocephalic  EYES: sclera anicteric  ENMT: mucous membranes moist  NECK: supple, No JVD  CHEST/LUNG: clear to auscultation bilaterally;    no      rales   ,   no rhonchi,   HEART: normal S1, S2  ABDOMEN: BS+, soft, ND, NT   EXTREMITIES:    no    edema    b/l LEs  NEURO: awake, ,     moves all extremities    no  focal deficits  SKIN: no     rash PHYSICAL EXAMINATION:  Vital Signs Last 24 Hrs  T(C): 36.7 (17 Jul 2021 20:44), Max: 36.7 (17 Jul 2021 12:36)  T(F): 98 (17 Jul 2021 20:44), Max: 98.1 (17 Jul 2021 12:36)  HR: 90 (17 Jul 2021 20:44) (84 - 114)  BP: 162/84 (17 Jul 2021 20:44) (125/81 - 162/84)  BP(mean): --  RR: 17 (17 Jul 2021 20:44) (17 - 18)  SpO2: 100% (17 Jul 2021 20:44) (99% - 100%)  CAPILLARY BLOOD GLUCOSE      POCT Blood Glucose.: 106 mg/dL (17 Jul 2021 20:49)        GENERAL: NAD, well-groomed,  HEAD:  atraumatic, normocephalic  EYES: sclera anicteric  ENMT: mucous membranes moist  NECK: supple, No JVD  CHEST/LUNG: clear to auscultation bilaterally;    no      rales   ,   no rhonchi,   HEART: normal S1, S2  ABDOMEN: BS+, soft, ND, NT   EXTREMITIES:    no    edema    b/l LEs  NEURO: awake, ,     moves all extremities    no  focal deficits/  speech  is  normal now  SKIN: no     rash

## 2021-07-17 NOTE — ED PROVIDER NOTE - CLINICAL SUMMARY MEDICAL DECISION MAKING FREE TEXT BOX
ddx: complex migraine no risk factors for CVA nih stroke scale 0, r/o SAH  plan: ct head, tylenol, reglan, & reassess

## 2021-07-17 NOTE — ED PROVIDER NOTE - OBJECTIVE STATEMENT
Pertinent PMH/PSH/FHx/SHx and Review of Systems contained within:     55 y/o F with PMHx of presents to the ED c/o migraine since yesterday with episodes of emesis. Pt was seen earlier today by Dr. Sauceda and was to be admitted but left AMA to "take care of some things." Pertinent PMH/PSH/FHx/SHx and Review of Systems contained within:     57 y/o F with PMH migraines, presents for admission for subacute CVA. Pt reports had Rt occipital and rt temporal headache x 6 days, and noted she had trouble findings her words. Pt reports the trouble findings words improved, but still present mildly. HA still present. Pt was seen earlier this morning and CT ntoed with Lt temporal and occipital CVA and signed out AMA.     No fever/chills, No photophobia/eye pain/changes in vision, No ear pain/sore throat/dysphagia, No chest pain/palpitations, no SOB/cough, no wheeze/stridor, No abdominal pain, No N/V/D, no dysuria/frequency/discharge, No neck/back pain, no rash, +HA/aphasia

## 2021-07-17 NOTE — ED ADULT NURSE NOTE - OBJECTIVE STATEMENT
pt was admitted to ED today and went AMA> pt came back due to increased headache. pt states she had headache since saturday. denies cp, dizziness, confusion, weakness.

## 2021-07-17 NOTE — ED ADULT TRIAGE NOTE - CHIEF COMPLAINT QUOTE
55 y/o female with PMH of Migraine. Presents to the ED with c/c of left sided headache & around the eye ongoing for the past 7 days, 1 episode of vomiting, with new onset of mild amnesia. pt denies any head trauma

## 2021-07-17 NOTE — ED ADULT NURSE NOTE - CHIEF COMPLAINT QUOTE
57 y/o female with PMH of Migraine. Presents to the ED with c/c of left sided headache & around the eye ongoing for the past 7 days, 1 episode of vomiting, with new onset of mild amnesia. pt denies any head trauma

## 2021-07-17 NOTE — ED PROVIDER NOTE - PHYSICAL EXAMINATION
Gen: Alert, Well appearing. NAD    Head: NC, AT, PERRL, normal lids/conjunctiva   ENT: patent oropharynx without erythema/exudate, uvula midline  Neck: supple, no tenderness/meningismus  Pulm: Bilateral clear BS, normal resp effort  CV: RRR, no M/R/G, +dist pulses   Abd: soft, NT/ND, +BS, no guarding/rebound tenderness  Mskel: no edema/erythema/cyanosis   Skin: no rash, no bruising  Neuro: AAOx3, no sensory/motor deficits,

## 2021-07-17 NOTE — H&P ADULT - HISTORY OF PRESENT ILLNESS
57 y/o F     with PMH migraines,               Pt  c/o  right  occipital and rt temporal headache x 6 days, and noted she had trouble findings her words.        Pt reports the trouble findings words improved, but still present mildly.        Pt was seen earlier this morning and CT ,  Lt temporal and occipital CVA and signed out AMA.     	No fever/chills, No photophobia/eye pain/changes in vision,       No chest pain/palpitations, no SOB/cough, no wheeze/stridor, No abdominal pain, No N/V/D   	55 y/o F     with PMH migraines,               Pt  c/o  right  occipital and rt temporal headache x 6 days, and noted she had trouble findings her words.        Pt reports the trouble findings words improved        Pt was seen earlier this morning and CT ,  Lt temporal and occipital CVA and signed out AMA., a s she  had  to take  care  of  her  petrs        No fever/chills, No photophobia/eye pain/changes in vision,       No chest pain/palpitations, no SOB/cough, no wheeze/stridor, No abdominal pain, No N/V/D

## 2021-07-17 NOTE — ED PROVIDER NOTE - PROGRESS NOTE DETAILS
Pt has CT showing signs of subacute stroke. Pt informed of results and recommended to stay for admission and MRI. 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 Pt agrees not to drive, and to use [employer]. Pt understands risks, and has capacity to leave.

## 2021-07-17 NOTE — ED PROVIDER NOTE - OBJECTIVE STATEMENT
55 y/o pmhx migraines presents with c/o right sided headache since yesterday with reported vomiting episodes. pt states since Monday she hasn't been able to piece her words together properly, since then she states it has gotten somewhat better. No aggravating factors, rates pain 5/10 and pain feels similar to prior migraines.

## 2021-07-18 DIAGNOSIS — R79.89 OTHER SPECIFIED ABNORMAL FINDINGS OF BLOOD CHEMISTRY: ICD-10-CM

## 2021-07-18 PROBLEM — G43.909 MIGRAINE, UNSPECIFIED, NOT INTRACTABLE, WITHOUT STATUS MIGRAINOSUS: Chronic | Status: ACTIVE | Noted: 2021-07-17

## 2021-07-18 LAB
ANION GAP SERPL CALC-SCNC: 7 MMOL/L — SIGNIFICANT CHANGE UP (ref 5–17)
BUN SERPL-MCNC: 18 MG/DL — SIGNIFICANT CHANGE UP (ref 7–23)
CALCIUM SERPL-MCNC: 9.1 MG/DL — SIGNIFICANT CHANGE UP (ref 8.5–10.1)
CHLORIDE SERPL-SCNC: 106 MMOL/L — SIGNIFICANT CHANGE UP (ref 96–108)
CHOLEST SERPL-MCNC: 254 MG/DL — HIGH
CO2 SERPL-SCNC: 25 MMOL/L — SIGNIFICANT CHANGE UP (ref 22–31)
CREAT SERPL-MCNC: 1.07 MG/DL — SIGNIFICANT CHANGE UP (ref 0.5–1.3)
GLUCOSE SERPL-MCNC: 95 MG/DL — SIGNIFICANT CHANGE UP (ref 70–99)
HCT VFR BLD CALC: 38.5 % — SIGNIFICANT CHANGE UP (ref 34.5–45)
HDLC SERPL-MCNC: 46 MG/DL — LOW
HGB BLD-MCNC: 12.2 G/DL — SIGNIFICANT CHANGE UP (ref 11.5–15.5)
LIPID PNL WITH DIRECT LDL SERPL: 180 MG/DL — HIGH
MCHC RBC-ENTMCNC: 27.1 PG — SIGNIFICANT CHANGE UP (ref 27–34)
MCHC RBC-ENTMCNC: 31.7 GM/DL — LOW (ref 32–36)
MCV RBC AUTO: 85.4 FL — SIGNIFICANT CHANGE UP (ref 80–100)
NON HDL CHOLESTEROL: 208 MG/DL — HIGH
NRBC # BLD: 0 /100 WBCS — SIGNIFICANT CHANGE UP (ref 0–0)
PLATELET # BLD AUTO: 280 K/UL — SIGNIFICANT CHANGE UP (ref 150–400)
POTASSIUM SERPL-MCNC: 3.8 MMOL/L — SIGNIFICANT CHANGE UP (ref 3.5–5.3)
POTASSIUM SERPL-SCNC: 3.8 MMOL/L — SIGNIFICANT CHANGE UP (ref 3.5–5.3)
RBC # BLD: 4.51 M/UL — SIGNIFICANT CHANGE UP (ref 3.8–5.2)
RBC # FLD: 13.7 % — SIGNIFICANT CHANGE UP (ref 10.3–14.5)
SODIUM SERPL-SCNC: 138 MMOL/L — SIGNIFICANT CHANGE UP (ref 135–145)
TRIGL SERPL-MCNC: 139 MG/DL — SIGNIFICANT CHANGE UP
TSH SERPL-MCNC: <0.005 UIU/ML — LOW (ref 0.36–3.74)
WBC # BLD: 4.27 K/UL — SIGNIFICANT CHANGE UP (ref 3.8–10.5)
WBC # FLD AUTO: 4.27 K/UL — SIGNIFICANT CHANGE UP (ref 3.8–10.5)

## 2021-07-18 PROCEDURE — 99254 IP/OBS CNSLTJ NEW/EST MOD 60: CPT

## 2021-07-18 RX ADMIN — ATORVASTATIN CALCIUM 80 MILLIGRAM(S): 80 TABLET, FILM COATED ORAL at 21:06

## 2021-07-18 RX ADMIN — Medication 650 MILLIGRAM(S): at 21:05

## 2021-07-18 RX ADMIN — Medication 650 MILLIGRAM(S): at 21:36

## 2021-07-18 RX ADMIN — HEPARIN SODIUM 5000 UNIT(S): 5000 INJECTION INTRAVENOUS; SUBCUTANEOUS at 17:54

## 2021-07-18 RX ADMIN — Medication 81 MILLIGRAM(S): at 11:14

## 2021-07-18 RX ADMIN — HEPARIN SODIUM 5000 UNIT(S): 5000 INJECTION INTRAVENOUS; SUBCUTANEOUS at 05:25

## 2021-07-18 NOTE — CONSULT NOTE ADULT - PROBLEM SELECTOR RECOMMENDATION 9
check free thyroxine, total T3, thyroid antibodies including TSI  Further management based on lab findings  Patient does not have any signs and symptoms of hyperthyroidism and could have subclinical level low for overactive thyroid on  Currently no indication of her thyroid sonogram or scan.  Thank You for the courtesy of this consultation !!!

## 2021-07-18 NOTE — CONSULT NOTE ADULT - SUBJECTIVE AND OBJECTIVE BOX
NEUROLOGY CONSULT    HPI:  57 yo with a history of migraine headaches which occur 3-4 times per year, and typically last 2-3 days.  She came to the ER because of a severe unremitting migraine headache x 6 days associated with word finding difficulty.  Today headache is improved, about 1-2/10 in severity.  Patient admitted because of abnormal head CT with hypodensity seen in the left temporo-occipital region, possible acute/subacute ischemic stroke.    PMHx: HTN, HLD    EKG: NSR    NEUROLOGICAL EXAM:  T 98.2 F  /68  HR 81  MS: Awake, alert, oriented x 4, language fluent, comprehension intact, naming intact, repetition intact, normal affect  CN: PERRLA, EOMI, visual fields intact, facial sensation intact, face symmetric, hearing intact, no dysarthria, symmetric palatal elevation, tongue midline, symmetric shoulder shrug and SCM strength  Motor: normal bulk, normal tone, power 5/5 in all four extremities, no tremors or fasciculations  Sensation: intact to light touch, vibration sense, proprioception  Reflexes: 2 at biceps, brachioradialis, patella, ankle bilaterally.  Flexor plantar response bilaterally.  No clonus  Coordination: no dysmetria  Gait: normal    NIHSS = 0    Assessment:  57 yo woman with resolving migraine headache.  Abnormal head CT with hypodensity in left temporo-occipital region, possible acute/subacute ischemic stroke.  Need MRI to confirm is acute stroke is present and rule out alternative pathologies, as patient's clinical presentation and exam are atypical for acute stroke.  Stroke risk factors include HTN, HLD.    Recommendations:  1. MRI brain  2. Aspirin 81mg PO daily  3. Statin therapy: Atorvastatin 80mg daily, target LDL < 70  4. Check lipid profile, Hgb A1C, ESR, CRP, CELIO, hypercoaguable panel  5. If stroke confirmed on brain MRI, obtain TTE  6. Cardica telemetry monitoring    Ford Harden MD  [facility] Department of Neurology  Ep[facility]

## 2021-07-18 NOTE — CONSULT NOTE ADULT - SUBJECTIVE AND OBJECTIVE BOX
Patient is a 56y old  Female who presents with a chief complaint of cva (18 Jul 2021 07:44)      Reason For Consult: suppressed TSH    HPI:    	57 y/o F     with PMH migraines,               Pt  c/o  right  occipital and rt temporal headache x 6 days, and noted she had trouble findings her words.        Pt reports the trouble findings words improved        Pt was seen earlier this morning and CT ,  Lt temporal and occipital CVA and signed out AMA., a s she  had  to take  care  of  her  petrs        No fever/chills, No photophobia/eye pain/changes in vision,       No chest pain/palpitations, no SOB/cough, no wheeze/stridor, No abdominal pain, No N/V/D (17 Jul 2021 22:20)  patient was told in the past she may have Hashimoto's thyroiditis.  She has never been treated with any levothyroxine or antidiabetic medications.  She comes in with headache and has been found to have TSH of < 0.005.  middle of the thyroid function tests are not available.  She denies any weight loss, tremors tachycardia increased sweating on any other signs and symptoms of hyperthyroidism    PAST MEDICAL & SURGICAL HISTORY:  Migraines    No significant past surgical history        FAMILY HISTORY:        Social History:    MEDICATIONS  (STANDING):  aspirin enteric coated 81 milliGRAM(s) Oral daily  atorvastatin 80 milliGRAM(s) Oral at bedtime  heparin   Injectable 5000 Unit(s) SubCutaneous every 12 hours    MEDICATIONS  (PRN):  acetaminophen   Tablet .. 650 milliGRAM(s) Oral every 6 hours PRN Mild Pain (1 - 3)      REVIEW OF SYSTEMS:  CONSTITUTIONAL:  as per HPI  HEENT:  Eyes:  No diplopia or blurred vision. ENT:  No earache, sore throat or runny nose.  CARDIOVASCULAR:  No pressure, squeezing, strangling, tightness, heaviness or aching about the chest, neck, axilla or epigastrium.  RESPIRATORY:  No cough, shortness of breath, PND or orthopnea.  GASTROINTESTINAL:  No nausea, vomiting or diarrhea.  GENITOURINARY:  No dysuria, frequency or urgency. No Blood in urine  MUSCULOSKELETAL:  no joint aches, no muscle pain, myalgia  SKIN:  No change in skin, hair or nails.  NEUROLOGIC:  No paresthesias, fasciculations, seizures or weakness.  PSYCHIATRIC:  No disorder of thought or mood.  ENDOCRINE:  No heat or cold intolerance, polyuria or polydipsia. abnormal weight gain or loss, oral thrush  HEMATOLOGICAL:  No easy bruising or bleeding.     T(C): 36.6 (07-18-21 @ 17:07), Max: 37.1 (07-18-21 @ 05:27)  HR: 85 (07-18-21 @ 17:07) (71 - 85)  BP: 125/68 (07-18-21 @ 17:07) (125/68 - 130/83)  RR: 18 (07-18-21 @ 17:07) (16 - 18)  SpO2: 98% (07-18-21 @ 17:07) (97% - 99%)  Wt(kg): --    PHYSICAL EXAM:  GENERAL: NAD, well-groomed, well-developed  HEAD:  Atraumatic, Normocephalic  EYES: EOMI, PERRLA, conjunctiva and sclera clear  ENMT: No tonsillar erythema, exudates, or enlargement; Moist mucous membranes, Good dentition, No lesions  NECK: Supple, No JVD, Normal thyroid  NERVOUS SYSTEM:  Alert & Oriented X3, Good concentration; Motor Strength 5/5 B/L upper and lower extremities; DTRs 2+ intact and symmetric  CHEST/LUNG: Clear to percussion bilaterally; No rales, rhonchi, wheezing, or rubs  HEART: Regular rate and rhythm; No murmurs, rubs, or gallops  ABDOMEN: Soft, Nontender, Nondistended; Bowel sounds present  EXTREMITIES:  2+ Peripheral Pulses, No clubbing, cyanosis, or edema  LYMPH: No lymphadenopathy noted  SKIN: No rashes or lesions    CAPILLARY BLOOD GLUCOSE                                12.2   4.27  )-----------( 280      ( 18 Jul 2021 08:01 )             38.5       CMP:  07-18 @ 08:01  SGPT --  Albumin --   Alk Phos --   Anion Gap 7   SGOT --   Total Bili --   BUN 18   Calcium Total 9.1   CO2 25   Chloride 106   Creatinine 1.07   eGFR if AA 68   eGFR if non AA 58   Glucose 95   Potassium 3.8   Protein --   Sodium 138      Thyroid Function Tests:  07-18 @ 08:01 TSH <0.005 FreeT4 -- T3 -- Anti TPO -- Anti Thyroglobulin Ab -- TSI --      Diabetes Tests:     Parathyroids:     Adrenals:       Radiology:

## 2021-07-18 NOTE — PROGRESS NOTE ADULT - SUBJECTIVE AND OBJECTIVE BOX
SUBJECTIVE / OVERNIGHT EVENTS: pt denioes chest pain, shortness of breath , ha      MEDICATIONS  (STANDING):  aspirin enteric coated 81 milliGRAM(s) Oral daily  atorvastatin 80 milliGRAM(s) Oral at bedtime  heparin   Injectable 5000 Unit(s) SubCutaneous every 12 hours    MEDICATIONS  (PRN):  acetaminophen   Tablet .. 650 milliGRAM(s) Oral every 6 hours PRN Mild Pain (1 - 3)    Vital Signs Last 24 Hrs  T(C): 36.6 (19 Jul 2021 05:21), Max: 36.9 (19 Jul 2021 01:48)  T(F): 97.8 (19 Jul 2021 05:21), Max: 98.4 (19 Jul 2021 01:48)  HR: 71 (19 Jul 2021 05:21) (71 - 85)  BP: 136/85 (19 Jul 2021 05:21) (115/72 - 136/85)  BP(mean): --  RR: 18 (19 Jul 2021 05:21) (16 - 18)  SpO2: 96% (19 Jul 2021 05:21) (96% - 98%)    CAPILLARY BLOOD GLUCOSE        I&O's Summary    18 Jul 2021 07:01  -  19 Jul 2021 06:37  --------------------------------------------------------  IN: 787 mL / OUT: 0 mL / NET: 787 mL        Constitutional: No fever, fatigue  Skin: No rash.  Eyes: No recent vision problems or eye pain.  ENT: No congestion, ear pain, or sore throat.  Cardiovascular: No chest pain or palpation.  Respiratory: No cough, shortness of breath, congestion, or wheezing.  Gastrointestinal: No abdominal pain, nausea, vomiting, or diarrhea.  Genitourinary: No dysuria.  Musculoskeletal: No joint swelling.  Neurologic: No headache.    PHYSICAL EXAM:  GENERAL: NAD  EYES: EOMI, PERRLA  NECK: Supple, No JVD  CHEST/LUNG: cta reed  HEART:  S1 , S2 +  ABDOMEN: soft , bs+  EXTREMITIES: no edema   NEUROLOGY:alert awake      LABS:                        12.2   4.27  )-----------( 280      ( 18 Jul 2021 08:01 )             38.5     07-18    138  |  106  |  18  ----------------------------<  95  3.8   |  25  |  1.07    Ca    9.1      18 Jul 2021 08:01    TPro  8.5<H>  /  Alb  3.6  /  TBili  0.3  /  DBili  x   /  AST  15  /  ALT  39  /  AlkPhos  86  07-17    PT/INR - ( 17 Jul 2021 16:48 )   PT: 11.8 sec;   INR: 1.02 ratio         PTT - ( 17 Jul 2021 16:48 )  PTT:30.7 sec          RADIOLOGY & ADDITIONAL TESTS:    Imaging Personally Reviewed:    Consultant(s) Notes Reviewed:      Care Discussed with Consultants/Other Providers:

## 2021-07-19 LAB
A1C WITH ESTIMATED AVERAGE GLUCOSE RESULT: 5.6 % — SIGNIFICANT CHANGE UP (ref 4–5.6)
ALBUMIN SERPL ELPH-MCNC: 3.6 G/DL — SIGNIFICANT CHANGE UP (ref 3.3–5)
ALP SERPL-CCNC: 93 U/L — SIGNIFICANT CHANGE UP (ref 40–120)
ALT FLD-CCNC: 27 U/L — SIGNIFICANT CHANGE UP (ref 12–78)
ANION GAP SERPL CALC-SCNC: 9 MMOL/L — SIGNIFICANT CHANGE UP (ref 5–17)
AST SERPL-CCNC: 7 U/L — LOW (ref 15–37)
BILIRUB SERPL-MCNC: 0.4 MG/DL — SIGNIFICANT CHANGE UP (ref 0.2–1.2)
BUN SERPL-MCNC: 19 MG/DL — SIGNIFICANT CHANGE UP (ref 7–23)
CALCIUM SERPL-MCNC: 9.5 MG/DL — SIGNIFICANT CHANGE UP (ref 8.5–10.1)
CHLORIDE SERPL-SCNC: 107 MMOL/L — SIGNIFICANT CHANGE UP (ref 96–108)
CHOLEST SERPL-MCNC: 263 MG/DL — HIGH
CHOLEST SERPL-MCNC: 271 MG/DL — HIGH
CO2 SERPL-SCNC: 23 MMOL/L — SIGNIFICANT CHANGE UP (ref 22–31)
COVID-19 SPIKE DOMAIN AB INTERP: POSITIVE
COVID-19 SPIKE DOMAIN ANTIBODY RESULT: >250 U/ML — HIGH
CREAT SERPL-MCNC: 0.93 MG/DL — SIGNIFICANT CHANGE UP (ref 0.5–1.3)
CRP SERPL-MCNC: 4 MG/L — SIGNIFICANT CHANGE UP
CRP SERPL-MCNC: 4 MG/L — SIGNIFICANT CHANGE UP
ERYTHROCYTE [SEDIMENTATION RATE] IN BLOOD: 31 MM/HR — HIGH (ref 0–20)
ERYTHROCYTE [SEDIMENTATION RATE] IN BLOOD: 34 MM/HR — HIGH (ref 0–20)
ESTIMATED AVERAGE GLUCOSE: 114 MG/DL — SIGNIFICANT CHANGE UP (ref 68–114)
GLUCOSE SERPL-MCNC: 97 MG/DL — SIGNIFICANT CHANGE UP (ref 70–99)
HCT VFR BLD CALC: 40.2 % — SIGNIFICANT CHANGE UP (ref 34.5–45)
HCYS SERPL-MCNC: 13.4 UMOL/L — SIGNIFICANT CHANGE UP
HDLC SERPL-MCNC: 49 MG/DL — LOW
HDLC SERPL-MCNC: 50 MG/DL — LOW
HGB BLD-MCNC: 13.1 G/DL — SIGNIFICANT CHANGE UP (ref 11.5–15.5)
LIPID PNL WITH DIRECT LDL SERPL: 181 MG/DL — HIGH
LIPID PNL WITH DIRECT LDL SERPL: 186 MG/DL — HIGH
MCHC RBC-ENTMCNC: 27.4 PG — SIGNIFICANT CHANGE UP (ref 27–34)
MCHC RBC-ENTMCNC: 32.6 GM/DL — SIGNIFICANT CHANGE UP (ref 32–36)
MCV RBC AUTO: 84.1 FL — SIGNIFICANT CHANGE UP (ref 80–100)
NON HDL CHOLESTEROL: 215 MG/DL — HIGH
NON HDL CHOLESTEROL: 221 MG/DL — HIGH
NRBC # BLD: 0 /100 WBCS — SIGNIFICANT CHANGE UP (ref 0–0)
PLATELET # BLD AUTO: 310 K/UL — SIGNIFICANT CHANGE UP (ref 150–400)
POTASSIUM SERPL-MCNC: 3.9 MMOL/L — SIGNIFICANT CHANGE UP (ref 3.5–5.3)
POTASSIUM SERPL-SCNC: 3.9 MMOL/L — SIGNIFICANT CHANGE UP (ref 3.5–5.3)
PROT SERPL-MCNC: 8.2 GM/DL — SIGNIFICANT CHANGE UP (ref 6–8.3)
RBC # BLD: 4.78 M/UL — SIGNIFICANT CHANGE UP (ref 3.8–5.2)
RBC # FLD: 13.4 % — SIGNIFICANT CHANGE UP (ref 10.3–14.5)
SARS-COV-2 IGG+IGM SERPL QL IA: >250 U/ML — HIGH
SARS-COV-2 IGG+IGM SERPL QL IA: POSITIVE
SODIUM SERPL-SCNC: 139 MMOL/L — SIGNIFICANT CHANGE UP (ref 135–145)
T3 SERPL-MCNC: 144 NG/DL — SIGNIFICANT CHANGE UP (ref 80–200)
T3FREE SERPL-MCNC: 4.12 PG/ML — SIGNIFICANT CHANGE UP (ref 1.8–4.6)
T3FREE SERPL-MCNC: 4.36 PG/ML — SIGNIFICANT CHANGE UP (ref 1.8–4.6)
T4 FREE SERPL-MCNC: 1.4 NG/DL — SIGNIFICANT CHANGE UP (ref 0.9–1.8)
T4 FREE SERPL-MCNC: 1.5 NG/DL — SIGNIFICANT CHANGE UP (ref 0.9–1.8)
THYROGLOB AB FLD IA-ACNC: 7199 IU/ML — HIGH
THYROGLOB AB SERPL-ACNC: 7199 IU/ML — HIGH
THYROPEROXIDASE AB SERPL-ACNC: 2473 IU/ML — HIGH
TRIGL SERPL-MCNC: 167 MG/DL — HIGH
TRIGL SERPL-MCNC: 174 MG/DL — HIGH
TSH SERPL-MCNC: <0.005 UIU/ML — SIGNIFICANT CHANGE UP (ref 0.36–3.74)
WBC # BLD: 4.39 K/UL — SIGNIFICANT CHANGE UP (ref 3.8–10.5)
WBC # FLD AUTO: 4.39 K/UL — SIGNIFICANT CHANGE UP (ref 3.8–10.5)

## 2021-07-19 PROCEDURE — 70551 MRI BRAIN STEM W/O DYE: CPT | Mod: 26

## 2021-07-19 PROCEDURE — 93306 TTE W/DOPPLER COMPLETE: CPT | Mod: 26

## 2021-07-19 PROCEDURE — 99232 SBSQ HOSP IP/OBS MODERATE 35: CPT

## 2021-07-19 RX ADMIN — ATORVASTATIN CALCIUM 80 MILLIGRAM(S): 80 TABLET, FILM COATED ORAL at 21:31

## 2021-07-19 RX ADMIN — HEPARIN SODIUM 5000 UNIT(S): 5000 INJECTION INTRAVENOUS; SUBCUTANEOUS at 17:18

## 2021-07-19 RX ADMIN — HEPARIN SODIUM 5000 UNIT(S): 5000 INJECTION INTRAVENOUS; SUBCUTANEOUS at 05:23

## 2021-07-19 RX ADMIN — Medication 81 MILLIGRAM(S): at 11:21

## 2021-07-19 NOTE — PROGRESS NOTE ADULT - SUBJECTIVE AND OBJECTIVE BOX
NEUROLOGY CONSULT PROGRESS NOTE    HPI:  patient is doing well, headache has resolved    LDL = 180    MRI brain pending    NEUROLOGICAL EXAM:  T 97.8 F  /85  HR 71  MS: Awake, alert, oriented x 4, language fluent, comprehension intact, naming intact, repetition intact, normal affect  CN: PERRLA, EOMI, visual fields intact, facial sensation intact, face symmetric, hearing intact, no dysarthria, symmetric palatal elevation, tongue midline, symmetric shoulder shrug and SCM strength  Motor: normal bulk, normal tone, power 5/5 in all four extremities, no tremors or fasciculations  Sensation: intact to light touch, vibration sense, proprioception  Reflexes: 2 at biceps, brachioradialis, patella, ankle bilaterally.  Flexor plantar response bilaterally.  No clonus  Coordination: no dysmetria  Gait: normal    NIHSS = 0    Assessment:  55 yo woman with migraine headache, resolved.  Abnormal head CT with hypodensity in left temporo-occipital region, possible acute/subacute ischemic stroke.  Need MRI to confirm is acute stroke is present and rule out alternative pathologies, as patient's clinical presentation and exam are atypical for acute stroke.  Stroke risk factors include HTN, HLD.    Recommendations:  1. MRI brain pending.  Further work up depending on MRI findings.  2. Aspirin 81mg PO daily  3. Statin therapy: Atorvastatin 80mg daily, target LDL < 70  4. Check Hgb A1C, ESR, CRP, CELIO, hypercoaguable panel  5. If stroke confirmed on brain MRI, obtain TTE  6. Cardica telemetry monitoring    Ford Harden MD  [facility] Department of Neurology  Epilepsy Center

## 2021-07-19 NOTE — PHYSICAL THERAPY INITIAL EVALUATION ADULT - PERTINENT HX OF CURRENT PROBLEM, REHAB EVAL
Admitted with dx CVA, MRI 7/19- Acute infarction within L MCA territory, no intracranial hemorrhage,  evidence of possible cortical lacunar stenosis.

## 2021-07-19 NOTE — PHYSICAL THERAPY INITIAL EVALUATION ADULT - LIVES WITH, PROFILE
Pt states she lives alone, pvt house, 3 steps to enter the house, 1 flight to rails to 2nd floor bedroom and 1 flight of steps with rails to go to the basement./alone

## 2021-07-19 NOTE — PHYSICAL THERAPY INITIAL EVALUATION ADULT - PLANNED THERAPY INTERVENTIONS, PT EVAL
*pt will be kept on  program for observation for any changes in functional ability./balance training/bed mobility training/gait training/strengthening/transfer training

## 2021-07-19 NOTE — PHYSICAL THERAPY INITIAL EVALUATION ADULT - GAIT TRAINING, PT EVAL
Independent and safe in ambulation on level and stairs includes community ambulation without needs of walking device.

## 2021-07-19 NOTE — PHYSICAL THERAPY INITIAL EVALUATION ADULT - PASSIVE RANGE OF MOTION EXAMINATION, REHAB EVAL
bilateral upper extremity Passive ROM was WNL (within normal limits)/bilateral lower extremity Passive ROM was WNL Epidermal Closure: running

## 2021-07-19 NOTE — PHYSICAL THERAPY INITIAL EVALUATION ADULT - ACTIVE RANGE OF MOTION EXAMINATION, REHAB EVAL
reed. upper extremity Active ROM was WNL (within normal limits)/bilateral lower extremity Active ROM was WNL (within normal limits)

## 2021-07-19 NOTE — PHYSICAL THERAPY INITIAL EVALUATION ADULT - BALANCE TRAINING, PT EVAL
Sitting, standing and transfer balance will remain good/normal and without need of assistive device.

## 2021-07-19 NOTE — PROGRESS NOTE ADULT - SUBJECTIVE AND OBJECTIVE BOX
Patient is a 56y old  Female who presents with a chief complaint of cva (19 Jul 2021 09:01)      Interval History: T3 is 144 normal , free T3, 415 high normal  and TSH < 0.05, free T4 is 1.5     MEDICATIONS  (STANDING):  aspirin enteric coated 81 milliGRAM(s) Oral daily  atorvastatin 80 milliGRAM(s) Oral at bedtime  heparin   Injectable 5000 Unit(s) SubCutaneous every 12 hours    MEDICATIONS  (PRN):  acetaminophen   Tablet .. 650 milliGRAM(s) Oral every 6 hours PRN Mild Pain (1 - 3)      Allergies    No Known Allergies    Intolerances        REVIEW OF SYSTEMS:  CONSTITUTIONAL: no changes      Vital Signs Last 24 Hrs  T(C): 36.7 (19 Jul 2021 16:42), Max: 36.9 (19 Jul 2021 01:48)  T(F): 98 (19 Jul 2021 16:42), Max: 98.4 (19 Jul 2021 01:48)  HR: 85 (19 Jul 2021 16:42) (71 - 86)  BP: 124/87 (19 Jul 2021 16:42) (115/72 - 136/85)  BP(mean): --  RR: 18 (19 Jul 2021 16:42) (17 - 18)  SpO2: 98% (19 Jul 2021 16:42) (96% - 98%)    PHYSICAL EXAM:  GENERAL: no changes  HEAD: Atraumatic, Normocephalic  EYES: PERRLA, conjunctiva and sclera clear  ENMT: No tonsillar erythema, exudates, or enlargement; Moist mucous membranes, Good dentition, No lesions  NECK: Supple, No JVD, Normal thyroid  NERVOUS SYSTEM:  Alert & Oriented X3, Good concentration; Motor Strength 5/5 B/L upper and lower extremities  CHEST/LUNG: Clear to auscultaion bilaterally; No rales, rhonchi, wheezing, or rubs  HEART: Regular rate and rhythm; No murmurs, rubs, or gallops  ABDOMEN: Soft, Nontender, Nondistended; Bowel sounds present  EXTREMITIES:  2+ Peripheral Pulses, no edema  SKIN: No rashes or lesions    LABS:        CAPILLARY BLOOD GLUCOSE        Lipid panel:           Thyroid:07-19 @ 09:14 TSH -- <<Free T4>> 1.4 <<T3>> 144 <<TG Ab>> -- <<ATPOAB>> -- <<TBG>> -- <<TSI>> -- Reverse T3 --  07-19 @ 09:12 TSH -- <<Free T4>> -- <<T3>> -- <<TG Ab>> 7199.0 <<ATPOAB>> 2473.0 <<TBG>> -- <<TSI>> -- Reverse T3 --  07-19 @ 06:22 TSH <0.005 <<Free T4>> -- <<T3>> -- <<TG Ab>> -- <<ATPOAB>> -- <<TBG>> -- <<TSI>> -- Reverse T3 --    Diabetes Tests:  Parathyroid Panel:  Adrenals:  RADIOLOGY & ADDITIONAL TESTS:    Imaging Personally Reviewed:  [ ] YES  [ ] NO    Consultant(s) Notes Reviewed:  [ ] YES  [ ] NO    Care Discussed with Consultants/Other Providers [ ] YES  [ ] NO

## 2021-07-19 NOTE — PHYSICAL THERAPY INITIAL EVALUATION ADULT - STRENGTHENING, PT EVAL
Strength in the UE and LE will remain 5/5 and maintain or improve endurance to good or normal and eventually return to normal ADLS.

## 2021-07-19 NOTE — PHYSICAL THERAPY INITIAL EVALUATION ADULT - GENERAL OBSERVATIONS, REHAB EVAL
Pt is alert, oriented x 4, follow directions, articulate, no slurring of speech, no pain, on room air the only complain is periodic difficulty forming words to form a sentence.

## 2021-07-19 NOTE — PHYSICAL THERAPY INITIAL EVALUATION ADULT - CRITERIA FOR SKILLED THERAPEUTIC INTERVENTIONS
Pt will still be kept on PT program for follow up and observation in functional ability in case of any changes in functional ability./risk reduction/prevention/anticipated discharge recommendation

## 2021-07-19 NOTE — PROGRESS NOTE ADULT - SUBJECTIVE AND OBJECTIVE BOX
SUBJECTIVE / OVERNIGHT EVENTS: pt denies chest pain, shortness of breath , ha    MEDICATIONS  (STANDING):  aspirin enteric coated 81 milliGRAM(s) Oral daily  atorvastatin 80 milliGRAM(s) Oral at bedtime  heparin   Injectable 5000 Unit(s) SubCutaneous every 12 hours    MEDICATIONS  (PRN):  acetaminophen   Tablet .. 650 milliGRAM(s) Oral every 6 hours PRN Mild Pain (1 - 3)    Vital Signs Last 24 Hrs  T(C): 36.7 (19 Jul 2021 16:42), Max: 36.9 (19 Jul 2021 01:48)  T(F): 98 (19 Jul 2021 16:42), Max: 98.4 (19 Jul 2021 01:48)  HR: 85 (19 Jul 2021 16:42) (71 - 86)  BP: 124/87 (19 Jul 2021 16:42) (115/72 - 136/85)  BP(mean): --  RR: 18 (19 Jul 2021 16:42) (17 - 18)  SpO2: 98% (19 Jul 2021 16:42) (96% - 98%)    I&O's Summary    18 Jul 2021 07:01  -  19 Jul 2021 06:37  --------------------------------------------------------  IN: 787 mL / OUT: 0 mL / NET: 787 mL        Constitutional: No fever, fatigue  Skin: No rash.  Eyes: No recent vision problems or eye pain.  ENT: No congestion, ear pain, or sore throat.  Cardiovascular: No chest pain or palpation.  Respiratory: No cough, shortness of breath, congestion, or wheezing.  Gastrointestinal: No abdominal pain, nausea, vomiting, or diarrhea.  Genitourinary: No dysuria.  Musculoskeletal: No joint swelling.  Neurologic: No headache.    PHYSICAL EXAM:  GENERAL: NAD  EYES: EOMI, PERRLA  NECK: Supple, No JVD  CHEST/LUNG: cta reed  HEART:  S1 , S2 +  ABDOMEN: soft , bs+  EXTREMITIES: no edema   NEUROLOGY:alert awake      LABS:  07-19    139  |  107  |  19  ----------------------------<  97  3.9   |  23  |  0.93    Ca    9.5      19 Jul 2021 06:26    TPro  8.2  /  Alb  3.6  /  TBili  0.4  /  DBili      /  AST  7<L>  /  ALT  27  /  AlkPhos  93  07-19    Creatinine Trend: 0.93 <--, 1.07 <--, 1.08 <--                        13.1   4.39  )-----------( 310      ( 19 Jul 2021 06:26 )             40.2     Urine Studies:            LIVER FUNCTIONS - ( 19 Jul 2021 06:26 )  Alb: 3.6 g/dL / Pro: 8.2 gm/dL / ALK PHOS: 93 U/L / ALT: 27 U/L / AST: 7 U/L / GGT: x               Imaging Personally Reviewed:    Consultant(s) Notes Reviewed:      Care Discussed with Consultants/Other Providers:

## 2021-07-19 NOTE — PHYSICAL THERAPY INITIAL EVALUATION ADULT - DIAGNOSIS, PT EVAL
Pt presented with ability to perform tasks- bed mobility, transfers and ambulation without need of assistive device and in a safe manner this session.

## 2021-07-19 NOTE — CHART NOTE - NSCHARTNOTEFT_GEN_A_CORE
Genetic Testing Consent    Medicine Hospitalist PA    Explained to patient regarding the need for genetic testing requested by Dr. Harden, neurologist for further w/u of possible blood disorder/hypercoagulability. The risk and benefits were discussed, the implications that can result from obtaining genetic testing including need for accurate family history/ethnic history. Verbalizes the understanding and consent was obtained, witnessed by JOHN Arce

## 2021-07-20 LAB
ANA PAT FLD IF-IMP: ABNORMAL
ANA TITR SER: ABNORMAL
APCR PPP: 1.81 RATIO — LOW
AT III ACT/NOR PPP CHRO: 90 % — SIGNIFICANT CHANGE UP (ref 85–135)
B2 GLYCOPROT1 AB SER QL: NEGATIVE — SIGNIFICANT CHANGE UP
CARDIOLIPIN AB SER-ACNC: NEGATIVE — SIGNIFICANT CHANGE UP
DRVVT SCREEN TO CONFIRM RATIO: SIGNIFICANT CHANGE UP
LA NT DPL PPP QL: 34.2 SEC — SIGNIFICANT CHANGE UP
PROT C ACT/NOR PPP: 122 % — SIGNIFICANT CHANGE UP (ref 74–150)

## 2021-07-20 PROCEDURE — 99232 SBSQ HOSP IP/OBS MODERATE 35: CPT

## 2021-07-20 PROCEDURE — 70498 CT ANGIOGRAPHY NECK: CPT | Mod: 26

## 2021-07-20 PROCEDURE — 70496 CT ANGIOGRAPHY HEAD: CPT | Mod: 26

## 2021-07-20 RX ORDER — TOPIRAMATE 25 MG
25 TABLET ORAL DAILY
Refills: 0 | Status: DISCONTINUED | OUTPATIENT
Start: 2021-07-20 | End: 2021-07-23

## 2021-07-20 RX ORDER — POTASSIUM CHLORIDE 20 MEQ
20 PACKET (EA) ORAL ONCE
Refills: 0 | Status: COMPLETED | OUTPATIENT
Start: 2021-07-20 | End: 2021-07-20

## 2021-07-20 RX ADMIN — Medication 650 MILLIGRAM(S): at 09:49

## 2021-07-20 RX ADMIN — Medication 81 MILLIGRAM(S): at 12:32

## 2021-07-20 RX ADMIN — Medication 650 MILLIGRAM(S): at 10:19

## 2021-07-20 RX ADMIN — HEPARIN SODIUM 5000 UNIT(S): 5000 INJECTION INTRAVENOUS; SUBCUTANEOUS at 17:09

## 2021-07-20 RX ADMIN — Medication 20 MILLIEQUIVALENT(S): at 16:37

## 2021-07-20 RX ADMIN — Medication 25 MILLIGRAM(S): at 12:32

## 2021-07-20 RX ADMIN — ATORVASTATIN CALCIUM 80 MILLIGRAM(S): 80 TABLET, FILM COATED ORAL at 22:21

## 2021-07-20 RX ADMIN — HEPARIN SODIUM 5000 UNIT(S): 5000 INJECTION INTRAVENOUS; SUBCUTANEOUS at 05:50

## 2021-07-20 NOTE — PROGRESS NOTE ADULT - SUBJECTIVE AND OBJECTIVE BOX
Patient is a 56y old  Female who presents with a chief complaint of cva (21 Jul 2021 08:14)      Interval History: no change in clinical status   subclinical Hyperthyroidism     MEDICATIONS  (STANDING):  aspirin enteric coated 81 milliGRAM(s) Oral daily  atorvastatin 80 milliGRAM(s) Oral at bedtime  heparin   Injectable 5000 Unit(s) SubCutaneous every 12 hours  topiramate 25 milliGRAM(s) Oral daily    MEDICATIONS  (PRN):  acetaminophen   Tablet .. 650 milliGRAM(s) Oral every 6 hours PRN Mild Pain (1 - 3)      Allergies    No Known Allergies    Intolerances        REVIEW OF SYSTEMS:  CONSTITUTIONAL: no changes  EYES: No eye pain, visual disturbances, or discharge  ENMT:  No difficulty hearing, No sinus or throat pain  NECK: No pain or stiffness  RESPIRATORY: No cough, wheezing, chills or hemoptysis; No shortness of breath  CARDIOVASCULAR: No chest pain, palpitations or leg swelling  GASTROINTESTINAL: No abdominal or epigastric pain. No nausea, vomiting, or hematemesis; No diarrhea or constipation. No melena or hematochezia.  GENITOURINARY: No dysuria, frequency, hematuria, or incontinence  NEUROLOGICAL: No headaches, memory loss, loss of strength, numbness, or tremors  SKIN: No itching, burning, rashes, or lesions   ENDOCRINE: No heat or cold intolerance; No hair loss  MUSCULOSKELETAL: No joint pain or swelling; No muscle, back, or extremity pain  PSYCHIATRIC: No depression, anxiety, mood swings, or difficulty sleeping  HEME/LYMPH: No easy bruising, or bleeding gums  ALLERY AND IMMUNOLOGIC: No hives or eczema    Vital Signs Last 24 Hrs  T(C): 36.5 (21 Jul 2021 05:30), Max: 36.8 (20 Jul 2021 15:46)  T(F): 97.7 (21 Jul 2021 05:30), Max: 98.3 (20 Jul 2021 15:46)  HR: 74 (21 Jul 2021 05:30) (74 - 89)  BP: 107/72 (21 Jul 2021 05:30) (107/72 - 124/86)  BP(mean): --  RR: 18 (21 Jul 2021 05:30) (18 - 18)  SpO2: 96% (21 Jul 2021 05:30) (96% - 98%)    PHYSICAL EXAM:  GENERAL:   HEAD: Atraumatic, Normocephalic  EYES: PERRLA, conjunctiva and sclera clear  ENMT: No tonsillar erythema, exudates, or enlargement; Moist mucous membranes, Good dentition, No lesions  NECK: Supple, No JVD, Normal thyroid  NERVOUS SYSTEM:  Alert & Oriented X3, Good concentration; Motor Strength 5/5 B/L upper and lower extremities  CHEST/LUNG: Clear to auscultaion bilaterally; No rales, rhonchi, wheezing, or rubs  HEART: Regular rate and rhythm; No murmurs, rubs, or gallops  ABDOMEN: Soft, Nontender, Nondistended; Bowel sounds present  EXTREMITIES:  2+ Peripheral Pulses, no edema  SKIN: No rashes or lesions    LABS:        CAPILLARY BLOOD GLUCOSE        Lipid panel:           Thyroid:  Diabetes Tests:  Parathyroid Panel:  Adrenals:  RADIOLOGY & ADDITIONAL TESTS:    Imaging Personally Reviewed:  [ ] YES  [ ] NO    Consultant(s) Notes Reviewed:  [ ] YES  [ ] NO    Care Discussed with Consultants/Other Providers [ ] YES  [ ] NO

## 2021-07-20 NOTE — PROGRESS NOTE ADULT - SUBJECTIVE AND OBJECTIVE BOX
NEUROLOGY CONSULT PROGRESS NOTE    HPI:  patient is doing well    MRI brain: acute ischemic infarct in inferior division of left MCA    TTE: EF 60-65%    NEUROLOGICAL EXAM:  T 97.7 F  /71  HR 73  MS: Awake, alert, oriented x 4, language fluent, comprehension intact, naming intact, repetition intact, normal affect  CN: PERRLA, EOMI, visual fields intact, facial sensation intact, face symmetric, hearing intact, no dysarthria, symmetric palatal elevation, tongue midline, symmetric shoulder shrug and SCM strength  Motor: normal bulk, normal tone, power 5/5 in all four extremities, no tremors or fasciculations  Sensation: intact to light touch, vibration sense, proprioception  Reflexes: 2 at biceps, brachioradialis, patella, ankle bilaterally.  Flexor plantar response bilaterally.  No clonus  Coordination: no dysmetria  Gait: normal    NIHSS = 0    Assessment:  57 yo woman with migraine headache, resolved.  Acute ischemic stroke in the inferior division of the left MCA, uncertain etiology.  Pattern on MRI (wedge-shaped cortical/subcortical infarct) can be seen with an embolic stroke.  No known embolic source.  Stroke risk factors include HTN, HLD.    Recommendations:  1. CTA head/neck pending  2. If no etiology seen on CTA head/neck, recommend full work up for a proximal embolic source (ie RIZWANA to visualize left atrial appendage and aortic arch, cardiac monitoring for paroxysmal Afib)  3. Antiplatelet therapy: since NIHSS < 3, recommend dual antiplatelet therapy x 21 days (ASA 81mg + Plavix 75mg daily), followed by Aspirin 81mg daily.  If it is decided that anticoagulation is needed, antiplatelet therapy can be discontinued.  4. Statin therapy: Atorvastatin 80mg daily, target LDL < 70  5. Check CELIO, hypercoaguable panel  6. Cardica telemetry monitoring    Ford Harden MD  [facility] Department of Neurology  Epilepsy Center         NEUROLOGY CONSULT PROGRESS NOTE    HPI:  patient is doing well    MRI brain: acute ischemic infarct in inferior division of left MCA    TTE: EF 60-65%    NEUROLOGICAL EXAM:  T 97.7 F  /71  HR 73  MS: Awake, alert, oriented x 4, language fluent, comprehension intact, naming intact, repetition intact, normal affect  CN: PERRLA, EOMI, visual fields intact, facial sensation intact, face symmetric, hearing intact, no dysarthria, symmetric palatal elevation, tongue midline, symmetric shoulder shrug and SCM strength  Motor: normal bulk, normal tone, power 5/5 in all four extremities, no tremors or fasciculations  Sensation: intact to light touch, vibration sense, proprioception  Reflexes: 2 at biceps, brachioradialis, patella, ankle bilaterally.  Flexor plantar response bilaterally.  No clonus  Coordination: no dysmetria  Gait: normal    NIHSS = 0    Assessment:  55 yo woman with migraine headache, resolved.  Acute ischemic stroke in the inferior division of the left MCA, uncertain etiology.  Pattern on MRI (wedge-shaped cortical/subcortical infarct) can be seen with an embolic stroke.  No known embolic source.  Stroke risk factors include HTN, HLD.    Recommendations:  1. CTA head/neck pending  2. If no etiology seen on CTA head/neck, recommend full work up for a proximal embolic source (ie RIZWANA to visualize left atrial appendage and aortic arch, cardiac monitoring for paroxysmal Afib)  3. Antiplatelet therapy: since NIHSS < 3, recommend dual antiplatelet therapy x 21 days (ASA 81mg + Plavix 75mg daily), followed by Aspirin 81mg daily.  If it is decided that anticoagulation is needed, antiplatelet therapy can be discontinued.  4. Statin therapy: Atorvastatin 80mg daily, target LDL < 70  5. Check CELIO, hypercoaguable panel  6. Cardica telemetry monitoring  7. Migraine prophylaxis: Topiramate 25m daily  8. Avoid triptans and ergots for acute migraine management    Ford Harden MD  [facility] Department of Neurology  Epilepsy Center

## 2021-07-20 NOTE — PROGRESS NOTE ADULT - SUBJECTIVE AND OBJECTIVE BOX
SUBJECTIVE / OVERNIGHT EVENTS: pt denies chest pain, shortness of breath , ha    MEDICATIONS  (STANDING):  aspirin enteric coated 81 milliGRAM(s) Oral daily  atorvastatin 80 milliGRAM(s) Oral at bedtime  heparin   Injectable 5000 Unit(s) SubCutaneous every 12 hours  topiramate 25 milliGRAM(s) Oral daily    MEDICATIONS  (PRN):  acetaminophen   Tablet .. 650 milliGRAM(s) Oral every 6 hours PRN Mild Pain (1 - 3)    Vital Signs Last 24 Hrs  T(C): 36.8 (20 Jul 2021 15:46), Max: 36.8 (20 Jul 2021 15:46)  T(F): 98.3 (20 Jul 2021 15:46), Max: 98.3 (20 Jul 2021 15:46)  HR: 86 (20 Jul 2021 15:46) (73 - 93)  BP: 124/86 (20 Jul 2021 15:46) (107/71 - 124/86)  BP(mean): 101 (20 Jul 2021 09:52) (101 - 101)  RR: 18 (20 Jul 2021 15:46) (18 - 18)  SpO2: 98% (20 Jul 2021 15:46) (95% - 98%)      Constitutional: No fever, fatigue  Skin: No rash.  Eyes: No recent vision problems or eye pain.  ENT: No congestion, ear pain, or sore throat.  Cardiovascular: No chest pain or palpation.  Respiratory: No cough, shortness of breath, congestion, or wheezing.  Gastrointestinal: No abdominal pain, nausea, vomiting, or diarrhea.  Genitourinary: No dysuria.  Musculoskeletal: No joint swelling.  Neurologic: No headache.    PHYSICAL EXAM:  GENERAL: NAD  EYES: EOMI, PERRLA  NECK: Supple, No JVD  CHEST/LUNG: cta reed  HEART:  S1 , S2 +  ABDOMEN: soft , bs+  EXTREMITIES: no edema   NEUROLOGY:alert awake      LABS:  07-19    139  |  107  |  19  ----------------------------<  97  3.9   |  23  |  0.93    Ca    9.5      19 Jul 2021 06:26    TPro  8.2  /  Alb  3.6  /  TBili  0.4  /  DBili      /  AST  7<L>  /  ALT  27  /  AlkPhos  93  07-19    Creatinine Trend: 0.93 <--, 1.07 <--, 1.08 <--                        13.1   4.39  )-----------( 310      ( 19 Jul 2021 06:26 )             40.2     Urine Studies:            LIVER FUNCTIONS - ( 19 Jul 2021 06:26 )  Alb: 3.6 g/dL / Pro: 8.2 gm/dL / ALK PHOS: 93 U/L / ALT: 27 U/L / AST: 7 U/L / GGT: x             IVER FUNCTIONS - ( 19 Jul 2021 06:26 )  Alb: 3.6 g/dL / Pro: 8.2 gm/dL / ALK PHOS: 93 U/L / ALT: 27 U/L / AST: 7 U/L / GGT: x               Imaging Personally Reviewed:    Consultant(s) Notes Reviewed:      Care Discussed with Consultants/Other Providers:

## 2021-07-21 LAB
ALBUMIN SERPL ELPH-MCNC: 3.6 G/DL — SIGNIFICANT CHANGE UP (ref 3.3–5)
ALP SERPL-CCNC: 90 U/L — SIGNIFICANT CHANGE UP (ref 40–120)
ALT FLD-CCNC: 29 U/L — SIGNIFICANT CHANGE UP (ref 12–78)
ANA PAT FLD IF-IMP: ABNORMAL
ANA TITR SER: ABNORMAL
ANION GAP SERPL CALC-SCNC: 6 MMOL/L — SIGNIFICANT CHANGE UP (ref 5–17)
AST SERPL-CCNC: 9 U/L — LOW (ref 15–37)
BASOPHILS # BLD AUTO: 0.03 K/UL — SIGNIFICANT CHANGE UP (ref 0–0.2)
BASOPHILS NFR BLD AUTO: 0.2 % — SIGNIFICANT CHANGE UP (ref 0–2)
BILIRUB SERPL-MCNC: 0.6 MG/DL — SIGNIFICANT CHANGE UP (ref 0.2–1.2)
BUN SERPL-MCNC: 19 MG/DL — SIGNIFICANT CHANGE UP (ref 7–23)
CALCIUM SERPL-MCNC: 9.7 MG/DL — SIGNIFICANT CHANGE UP (ref 8.5–10.1)
CHLORIDE SERPL-SCNC: 104 MMOL/L — SIGNIFICANT CHANGE UP (ref 96–108)
CO2 SERPL-SCNC: 26 MMOL/L — SIGNIFICANT CHANGE UP (ref 22–31)
CREAT SERPL-MCNC: 1.06 MG/DL — SIGNIFICANT CHANGE UP (ref 0.5–1.3)
EOSINOPHIL # BLD AUTO: 0.02 K/UL — SIGNIFICANT CHANGE UP (ref 0–0.5)
EOSINOPHIL NFR BLD AUTO: 0.1 % — SIGNIFICANT CHANGE UP (ref 0–6)
GLUCOSE SERPL-MCNC: 101 MG/DL — HIGH (ref 70–99)
HCT VFR BLD CALC: 40.6 % — SIGNIFICANT CHANGE UP (ref 34.5–45)
HCT VFR BLD CALC: 42.2 % — SIGNIFICANT CHANGE UP (ref 34.5–45)
HGB BLD-MCNC: 13.4 G/DL — SIGNIFICANT CHANGE UP (ref 11.5–15.5)
HGB BLD-MCNC: 13.5 G/DL — SIGNIFICANT CHANGE UP (ref 11.5–15.5)
IMM GRANULOCYTES NFR BLD AUTO: 0.4 % — SIGNIFICANT CHANGE UP (ref 0–1.5)
LACTATE SERPL-SCNC: 1.6 MMOL/L — SIGNIFICANT CHANGE UP (ref 0.7–2)
LYMPHOCYTES # BLD AUTO: 1.22 K/UL — SIGNIFICANT CHANGE UP (ref 1–3.3)
LYMPHOCYTES # BLD AUTO: 8.6 % — LOW (ref 13–44)
MCHC RBC-ENTMCNC: 27.3 PG — SIGNIFICANT CHANGE UP (ref 27–34)
MCHC RBC-ENTMCNC: 27.6 PG — SIGNIFICANT CHANGE UP (ref 27–34)
MCHC RBC-ENTMCNC: 32 GM/DL — SIGNIFICANT CHANGE UP (ref 32–36)
MCHC RBC-ENTMCNC: 33 GM/DL — SIGNIFICANT CHANGE UP (ref 32–36)
MCV RBC AUTO: 83.7 FL — SIGNIFICANT CHANGE UP (ref 80–100)
MCV RBC AUTO: 85.4 FL — SIGNIFICANT CHANGE UP (ref 80–100)
MONOCYTES # BLD AUTO: 0.67 K/UL — SIGNIFICANT CHANGE UP (ref 0–0.9)
MONOCYTES NFR BLD AUTO: 4.7 % — SIGNIFICANT CHANGE UP (ref 2–14)
NEUTROPHILS # BLD AUTO: 12.25 K/UL — HIGH (ref 1.8–7.4)
NEUTROPHILS NFR BLD AUTO: 86 % — HIGH (ref 43–77)
NRBC # BLD: 0 /100 WBCS — SIGNIFICANT CHANGE UP (ref 0–0)
NRBC # BLD: 0 /100 WBCS — SIGNIFICANT CHANGE UP (ref 0–0)
PLATELET # BLD AUTO: 324 K/UL — SIGNIFICANT CHANGE UP (ref 150–400)
PLATELET # BLD AUTO: 326 K/UL — SIGNIFICANT CHANGE UP (ref 150–400)
POTASSIUM SERPL-MCNC: 4.1 MMOL/L — SIGNIFICANT CHANGE UP (ref 3.5–5.3)
POTASSIUM SERPL-SCNC: 4.1 MMOL/L — SIGNIFICANT CHANGE UP (ref 3.5–5.3)
PROT S FREE PPP-ACNC: 100 % — SIGNIFICANT CHANGE UP (ref 63–140)
PROT SERPL-MCNC: 8.5 GM/DL — HIGH (ref 6–8.3)
RBC # BLD: 4.85 M/UL — SIGNIFICANT CHANGE UP (ref 3.8–5.2)
RBC # BLD: 4.94 M/UL — SIGNIFICANT CHANGE UP (ref 3.8–5.2)
RBC # FLD: 13.4 % — SIGNIFICANT CHANGE UP (ref 10.3–14.5)
RBC # FLD: 13.4 % — SIGNIFICANT CHANGE UP (ref 10.3–14.5)
SODIUM SERPL-SCNC: 136 MMOL/L — SIGNIFICANT CHANGE UP (ref 135–145)
WBC # BLD: 14.24 K/UL — HIGH (ref 3.8–10.5)
WBC # BLD: 5.3 K/UL — SIGNIFICANT CHANGE UP (ref 3.8–10.5)
WBC # FLD AUTO: 14.24 K/UL — HIGH (ref 3.8–10.5)
WBC # FLD AUTO: 5.3 K/UL — SIGNIFICANT CHANGE UP (ref 3.8–10.5)

## 2021-07-21 PROCEDURE — 93880 EXTRACRANIAL BILAT STUDY: CPT | Mod: 26

## 2021-07-21 PROCEDURE — 99223 1ST HOSP IP/OBS HIGH 75: CPT

## 2021-07-21 PROCEDURE — 99232 SBSQ HOSP IP/OBS MODERATE 35: CPT

## 2021-07-21 PROCEDURE — 93010 ELECTROCARDIOGRAM REPORT: CPT

## 2021-07-21 PROCEDURE — 93312 ECHO TRANSESOPHAGEAL: CPT | Mod: 26

## 2021-07-21 PROCEDURE — 93320 DOPPLER ECHO COMPLETE: CPT | Mod: 26

## 2021-07-21 RX ORDER — PIPERACILLIN AND TAZOBACTAM 4; .5 G/20ML; G/20ML
3.38 INJECTION, POWDER, LYOPHILIZED, FOR SOLUTION INTRAVENOUS ONCE
Refills: 0 | Status: DISCONTINUED | OUTPATIENT
Start: 2021-07-21 | End: 2021-07-21

## 2021-07-21 RX ORDER — CEFTRIAXONE 500 MG/1
1000 INJECTION, POWDER, FOR SOLUTION INTRAMUSCULAR; INTRAVENOUS ONCE
Refills: 0 | Status: COMPLETED | OUTPATIENT
Start: 2021-07-21 | End: 2021-07-21

## 2021-07-21 RX ORDER — ACETAMINOPHEN 500 MG
650 TABLET ORAL EVERY 6 HOURS
Refills: 0 | Status: DISCONTINUED | OUTPATIENT
Start: 2021-07-21 | End: 2021-07-23

## 2021-07-21 RX ORDER — VANCOMYCIN HCL 1 G
1000 VIAL (EA) INTRAVENOUS ONCE
Refills: 0 | Status: COMPLETED | OUTPATIENT
Start: 2021-07-21 | End: 2021-07-21

## 2021-07-21 RX ADMIN — Medication 25 MILLIGRAM(S): at 17:57

## 2021-07-21 RX ADMIN — Medication 650 MILLIGRAM(S): at 17:30

## 2021-07-21 RX ADMIN — ATORVASTATIN CALCIUM 80 MILLIGRAM(S): 80 TABLET, FILM COATED ORAL at 21:18

## 2021-07-21 RX ADMIN — HEPARIN SODIUM 5000 UNIT(S): 5000 INJECTION INTRAVENOUS; SUBCUTANEOUS at 06:32

## 2021-07-21 RX ADMIN — Medication 650 MILLIGRAM(S): at 23:40

## 2021-07-21 RX ADMIN — CEFTRIAXONE 100 MILLIGRAM(S): 500 INJECTION, POWDER, FOR SOLUTION INTRAMUSCULAR; INTRAVENOUS at 23:40

## 2021-07-21 RX ADMIN — Medication 650 MILLIGRAM(S): at 09:27

## 2021-07-21 RX ADMIN — Medication 650 MILLIGRAM(S): at 16:52

## 2021-07-21 RX ADMIN — Medication 81 MILLIGRAM(S): at 17:57

## 2021-07-21 RX ADMIN — HEPARIN SODIUM 5000 UNIT(S): 5000 INJECTION INTRAVENOUS; SUBCUTANEOUS at 17:57

## 2021-07-21 NOTE — PROGRESS NOTE ADULT - SUBJECTIVE AND OBJECTIVE BOX
SUBJECTIVE / OVERNIGHT EVENTS: pt denies chest pain, shortness of breath , ha    MEDICATIONS  (STANDING):  aspirin enteric coated 81 milliGRAM(s) Oral daily  atorvastatin 80 milliGRAM(s) Oral at bedtime  heparin   Injectable 5000 Unit(s) SubCutaneous every 12 hours  topiramate 25 milliGRAM(s) Oral daily    MEDICATIONS  (PRN):  acetaminophen   Tablet .. 650 milliGRAM(s) Oral every 6 hours PRN Mild Pain (1 - 3)    Vital Signs Last 24 Hrs  T(C): 36.9 (21 Jul 2021 22:23), Max: 36.9 (21 Jul 2021 22:23)  T(F): 98.5 (21 Jul 2021 22:23), Max: 98.5 (21 Jul 2021 22:23)  HR: 129 (21 Jul 2021 22:23) (74 - 129)  BP: 123/74 (21 Jul 2021 22:23) (89/72 - 138/75)  BP(mean): --  RR: 17 (21 Jul 2021 22:23) (15 - 18)  SpO2: 95% (21 Jul 2021 22:23) (95% - 99%)    Constitutional: No fever, fatigue  Skin: No rash.  Eyes: No recent vision problems or eye pain.  ENT: No congestion, ear pain, or sore throat.  Cardiovascular: No chest pain or palpation.  Respiratory: No cough, shortness of breath, congestion, or wheezing.  Gastrointestinal: No abdominal pain, nausea, vomiting, or diarrhea.  Genitourinary: No dysuria.  Musculoskeletal: No joint swelling.  Neurologic: No headache.    PHYSICAL EXAM:  GENERAL: NAD  EYES: EOMI, PERRLA  NECK: Supple, No JVD  CHEST/LUNG: cta reed  HEART:  S1 , S2 +  ABDOMEN: soft , bs+  EXTREMITIES: no edema   NEUROLOGY:alert awake      LABS:  07-21    136  |  104  |  19  ----------------------------<  101<H>  4.1   |  26  |  1.06    Ca    9.7      21 Jul 2021 07:18    TPro  8.5<H>  /  Alb  3.6  /  TBili  0.6  /  DBili      /  AST  9<L>  /  ALT  29  /  AlkPhos  90  07-21    Creatinine Trend: 1.06 <--, 0.93 <--, 1.07 <--, 1.08 <--                        13.5   5.30  )-----------( 324      ( 21 Jul 2021 07:18 )             42.2     Urine Studies:            LIVER FUNCTIONS - ( 21 Jul 2021 07:18 )  Alb: 3.6 g/dL / Pro: 8.5 gm/dL / ALK PHOS: 90 U/L / ALT: 29 U/L / AST: 9 U/L / GGT: x

## 2021-07-21 NOTE — PROGRESS NOTE ADULT - SUBJECTIVE AND OBJECTIVE BOX
Patient is a 56y old  Female who presents with a chief complaint of cva (22 Jul 2021 08:13)      Interval History: subclinical Hyperthyroidism   stable Endocrine wise    MEDICATIONS  (STANDING):  aspirin enteric coated 81 milliGRAM(s) Oral daily  atorvastatin 80 milliGRAM(s) Oral at bedtime  cefTRIAXone   IVPB 1000 milliGRAM(s) IV Intermittent every 24 hours  enoxaparin Injectable 110 milliGRAM(s) SubCutaneous two times a day  topiramate 25 milliGRAM(s) Oral daily  vancomycin  IVPB 1250 milliGRAM(s) IV Intermittent every 12 hours    MEDICATIONS  (PRN):  acetaminophen   Tablet .. 650 milliGRAM(s) Oral every 6 hours PRN Temp greater or equal to 38C (100.4F), Mild Pain (1 - 3)  ketorolac   Injectable 15 milliGRAM(s) IV Push once PRN Moderate Pain (4 - 6)      Allergies    No Known Allergies    Intolerances        REVIEW OF SYSTEMS:  CONSTITUTIONAL: no changes  EYES: No eye pain, visual disturbances, or discharge  ENMT:  No difficulty hearing, No sinus or throat pain  NECK: No pain or stiffness  RESPIRATORY: No cough, wheezing, chills or hemoptysis; No shortness of breath  CARDIOVASCULAR: No chest pain, palpitations or leg swelling  GASTROINTESTINAL: No abdominal or epigastric pain. No nausea, vomiting, or hematemesis; No diarrhea or constipation. No melena or hematochezia.  GENITOURINARY: No dysuria, frequency, hematuria, or incontinence  NEUROLOGICAL: No headaches, memory loss, loss of strength, numbness, or tremors  SKIN: No itching, burning, rashes, or lesions   ENDOCRINE: No heat or cold intolerance; No hair loss  MUSCULOSKELETAL: No joint pain or swelling; No muscle, back, or extremity pain  PSYCHIATRIC: No depression, anxiety, mood swings, or difficulty sleeping  HEME/LYMPH: No easy bruising, or bleeding gums  ALLERY AND IMMUNOLOGIC: No hives or eczema    Vital Signs Last 24 Hrs  T(C): 37.7 (22 Jul 2021 05:16), Max: 38.9 (21 Jul 2021 22:23)  T(F): 99.9 (22 Jul 2021 05:16), Max: 102 (21 Jul 2021 22:23)  HR: 80 (22 Jul 2021 05:16) (79 - 129)  BP: 122/74 (22 Jul 2021 05:16) (89/72 - 138/75)  BP(mean): --  RR: 17 (22 Jul 2021 05:16) (15 - 18)  SpO2: 97% (22 Jul 2021 05:16) (95% - 99%)    PHYSICAL EXAM:  GENERAL:   HEAD: Atraumatic, Normocephalic  EYES: PERRLA, conjunctiva and sclera clear  ENMT: No tonsillar erythema, exudates, or enlargement; Moist mucous membranes, Good dentition, No lesions  NECK: Supple, No JVD, Normal thyroid  NERVOUS SYSTEM:  Alert & Oriented X3, Good concentration; Motor Strength 5/5 B/L upper and lower extremities  CHEST/LUNG: Clear to auscultaion bilaterally; No rales, rhonchi, wheezing, or rubs  HEART: Regular rate and rhythm; No murmurs, rubs, or gallops  ABDOMEN: Soft, Nontender, Nondistended; Bowel sounds present  EXTREMITIES:  2+ Peripheral Pulses, no edema  SKIN: No rashes or lesions    LABS:        CAPILLARY BLOOD GLUCOSE        Lipid panel:           Thyroid:  Diabetes Tests:  Parathyroid Panel:  Adrenals:  RADIOLOGY & ADDITIONAL TESTS:    Imaging Personally Reviewed:  [ ] YES  [ ] NO    Consultant(s) Notes Reviewed:  [ ] YES  [ ] NO    Care Discussed with Consultants/Other Providers [ ] YES  [ ] NO

## 2021-07-21 NOTE — CONSULT NOTE ADULT - SUBJECTIVE AND OBJECTIVE BOX
CARDIOLOGY CONSULTATION NOTE                                                                             NOHEMI FITZGERALD is a 56y Female with PMH migraines. Also HTN, HLD. Admitted with c/o right occipital and rt temporal headache x 6 days, and noted she had trouble findings her words. Was seen initially in ED with a  CT that demonstrated a left temporal and occipital CVA, but she signed out AMA, in order to take care of her pets. Readmitted. neurology diagnosed acute ischemic stroke of inferior division of left MCA, with pattern suggestive of embolic event.  RIZWANA requested for further evaluation of source of embolism.  REVIEW OF SYSTEMS: -----------------------------  CONSTITUTIONAL: No fever, weight loss, or fatigue EYES: No eye pain, visual disturbances, or discharge ENMT:  No difficulty hearing, tinnitus, vertigo; No sinus or throat pain NECK: No pain or stiffness BREASTS: No pain, masses, or nipple discharge RESPIRATORY: No cough, wheezing, chills or hemoptysis; No shortness of breath CARDIOVASCULAR: See HPI GASTROINTESTINAL: No abdominal or epigastric pain. No nausea, vomiting, or hematemesis; No diarrhea or constipation. No melena or hematochezia. GENITOURINARY: No dysuria, frequency, hematuria, or incontinence NEUROLOGICAL: No headaches, memory loss, loss of strength, numbness, or tremors SKIN: No itching, burning, rashes, or lesions  LYMPH NODES: No enlarged glands ENDOCRINE: No heat or cold intolerance; No hair loss MUSCULOSKELETAL: No joint pain or swelling; No muscle, back, or extremity pain PSYCHIATRIC: No depression, anxiety, mood swings, or difficulty sleeping HEME/LYMPH: No easy bruising, or bleeding gums ALLERGY AND IMMUNOLOGIC: No hives or eczema  Home Medications:   MEDICATIONS  (STANDING): aspirin enteric coated 81 milliGRAM(s) Oral daily atorvastatin 80 milliGRAM(s) Oral at bedtime heparin   Injectable 5000 Unit(s) SubCutaneous every 12 hours topiramate 25 milliGRAM(s) Oral daily   ALLERGIES: No Known Allergies   PHYSICAL EXAMINATION: ----------------------------- T(C): 36.5 (21 @ 05:30), Max: 36.8 (07-20-21 @ 15:46) HR: 74 (21 @ 05:30) (74 - 93) BP: 107/72 (21 @ 05:30) (107/72 - 124/86) RR: 18 (21 @ 05:30) (18 - 18) SpO2: 96% (21 @ 05:30) (96% - 98%) Wt(kg): --  Constitutional: well developed, normal appearance, well groomed, well nourished, no deformities and no acute distress.  Eyes: the conjunctiva exhibited no abnormalities and the eyelids demonstrated no xanthelasmas.  HEENT: normal oral mucosa, no oral pallor and no oral cyanosis.  Neck: normal jugular venous A waves present, normal jugular venous V waves present and no jugular venous ray A waves.  Pulmonary: no respiratory distress, normal respiratory rhythm and effort, no accessory muscle use and lungs were clear to auscultation bilaterally.  Cardiovascular: heart rate and rhythm were normal, normal S1 and S2 and no murmur, gallop, rub, heave or thrill are present.  Abdomen: soft, non-tender, no hepato-splenomegaly and no abdominal mass palpated.  Musculoskeletal: the gait could not be assessed..  Extremities: no clubbing of the fingernails, no localized cyanosis, no petechial hemorrhages and no ischemic changes.  Skin: normal skin color and pigmentation, no rash, no venous stasis, no skin lesions, no skin ulcer and no xanthoma was observed.  Psychiatric: oriented to person, place, and time, the affect was normal, the mood was normal and not feeling anxious.   ECG: -------  < from: 12 Lead ECG (21 @ 21:55) >  Ventricular Rate 93 BPM  Atrial Rate 93 BPM  P-R Interval 146 ms  QRS Duration 84 ms  Q-T Interval 358 ms  QTC Calculation(Bazett) 445 ms  P Axis 21 degrees  R Axis 113 degrees  T Axis 42 degrees  Diagnosis Line Normal sinus rhythm Left posterior fascicular block Abnormal ECG When compared with ECG of 24-MAR-2020 22:48, Left posterior fascicular block is now present Confirmed by CHIRAG MASON MD ([id number]) on 2021 9:32:53 AM  < end of copied text >   LABS:  --------   136  |  104  |  19 ----------------------------<  101<H> 4.1   |  26  |  1.06  Ca    9.7      2021 07:18  TPro  8.5<H>  /  Alb  3.6  /  TBili  0.6  /  DBili  x   /  AST  9<L>  /  ALT  29  /  AlkPhos  90                         13.5  5.30  )-----------( 324      ( 2021 07:18 )            42.2       271 mg/dL<H>, --, 50 mg/dL<L>, 174 mg/dL<H> 263 mg/dL<H>, --, 49 mg/dL<L>, 167 mg/dL<H>    RADIOLOGY REPORTS: ----------------------------- < from: CT Angio Neck w/ IV Cont (21 @ 15:03) >  EXAM:  CT ANGIO NECK (W)AW IC                         PROCEDURE DATE:  2021      INTERPRETATION:  EXAM:  CT ANGIO BRAIN (W)AW IC   PROCEDURE DATE:  2021  IMPRESSION:   1. Complex posterior wall plaque proximal left internal carotid artery which has an ulcerated component in addition to calcified plaque. Moderate narrowing of the internal carotid at the origin. 2. Mild narrowing right internal/external carotid at the origin. Patent vertebrals. 3. Attenuation of distal left MCA branches corresponding to posterior/inferior division with possible reconstitution. No proximal MCA lesion. This raises the possibility of embolic or thrombotic disease. 4. Patent vertebrobasilar system.  --- End of Report ---      STAR MCCRACKEN MD; Attending Radiologist This document has been electronically signed. 2021  4:15PM  < end of copied text >    ECHOCARDIOGRAM: --------------------------- < from: TTE Echo Complete w/o Contrast w/ Doppler (21 @ 14:18) >   EXAM:  ECHO TTE WO CON COMP W DOPP      PROCEDURE DATE:  2021     INTERPRETATION:  TRANSTHORACIC ECHOCARDIOGRAM REPORT    Patient Name:   NOHEMI FITZGERALD Patient Location: [facility] Rec #:  [id number]        Accession #:      [id number] Account #:                        Height:           70.1 in 178.0 cm YOB: 1965         Weight:           261.9 lb 118.80 kg Patient Age:    56 years          BSA:              2.34 m² Patient Gender: F                 BP:           136/85 mmHg   Date of Exam:        2021 2:18:11 PM Sonographer:         ROBBIN Referring Physician: KEVIN  Procedure:     2D Echo/Doppler/Color Doppler Complete. Indications:   Cerebral infarction due to unspecified occlusion or stenosis of                unspecified cerebral artery - I63.50; Abnormal electrocardiogram                [ECG] [EKG] - R94.31 Diagnosis:     Cerebral infarction due to unspecified occlusion or stenosis of                unspecified cerebral artery - I63.50; Abnormal electrocardiogram                [ECG] [EKG] - R94.31 Study Details: Technically adequate study.    2D AND M-MODE MEASUREMENTS (normal ranges within parentheses): Left Ventricle:                  Normal   Aorta/Left Atrium:          Normal IVSd (2D):              1.18 cm (0.7-1.1) Aortic Root (2D):  3.08 cm (2.4-3.7) LVPWd (2D):             1.19 cm (0.7-1.1) Left Atrium (2D):  3.48 cm (1.9-4.0) LVIDd (2D):             4.33 cm (3.4-5.7) Right Ventricle: LVIDs (2D):           2.43 cm           TAPSE:           2.16 cm LV FS (2D):             43.9 %   (>25%) Relative Wall Thickness  0.55    (<0.42)  LV DIASTOLIC FUNCTION: MV Peak E: 0.65 m/s Decel Time:  276 msec MV Peak A: 0.69 m/s Septal E/e'  7.9 E/A Ratio: 0.94     Lateral E/e' 6.2 Septal e'  0.1 m/s Lateral e' 0.1 m/s  SPECTRAL DOPPLER ANALYSIS (where applicable): Mitral Valve: MV P1/2 Time: 80.12 msec MV Area, PHT: 2.75 cm²  Aortic Valve: AoV Max Dylan: 1.27 m/s AoV Peak P.5 mmHg AoV Mean PG: 3.3 mmHg  LVOT Vmax: 0.94 m/s LVOT VTI: 0.198 m LVOT Diameter: 2.25 cm  AoV Area, Vmax: 2.94 cm² AoV Area, VTI: 2.90 cm² AoV Area, Vmn: 2.64 cm²  Tricuspid Valve and PA/RV Systolic Pressure: TR Max Velocity: 2.18 m/s RA Pressure: 5 mmHgRVSP/PASP: 24.1 mmHg   PHYSICIAN INTERPRETATION: Left Ventricle: Normal left ventricular size and wall thicknesses, with normal systolic and diastolic function. Left ventricular ejection fraction, by visual estimation, is 60 to 65%. Right Ventricle: Normal right ventricular size and function. Left Atrium: The left atrium is normal in size. Right Atrium: The right atrium is normal in size. Pericardium: There is no evidence of pericardial effusion. Mitral Valve: Structurally normal mitralvalve, with normal leaflet excursion. Trace mitral valve regurgitation is seen. Tricuspid Valve: Structurally normal tricuspid valve, with normal leaflet excursion. Mild tricuspid regurgitation is visualized. Aortic Valve: Normal trileaflet aortic valve with normal opening. Peak transaortic gradient equals 6.5 mmHg, mean transaortic gradient equals 3.3 mmHg, the calculated aortic valve area equals 2.90 cm² by the continuity equation consistent with normally opening aortic valve. No evidence of aortic valve regurgitation is seen. Pulmonic Valve: Structurally normal pulmonic valve, with normal leaflet excursion. No indication of pulmonic valve regurgitation. Aorta: The aortic root and ascending aorta are structurally normal, with no evidence of dilitation. Pulmonary Artery: The main pulmonary artery is normal in size.   Summary:  1. Left ventricular ejection fraction, by visual estimation, is 60 to 65%.  2. There is mild concentric left ventricular hypertrophy.  3. Trace mitral valve regurgitation.  4. Mild tricuspid regurgitation.  5. No evidence of any thrombus.   [id number] Pramod Henriquez MD, FACC Electronically signed on 2021 at 4:22:22 PM

## 2021-07-21 NOTE — CONSULT NOTE ADULT - SUBJECTIVE AND OBJECTIVE BOX
VASCULAR SURGERY CONSULT NOTE    Patient seen and examined with Dr. Sylvester    Patient is a 56y old  Female who presents with a chief complaint of cva (21 Jul 2021 08:14)      HPI:    	55 y/o F     with PMH migraines,               Pt  c/o  right  occipital and rt temporal headache x 6 days, and noted she had trouble findings her words.        Pt reports the trouble findings words improved        Pt was seen earlier this morning and CT ,  Lt temporal and occipital CVA and signed out AMA., a s she  had  to take  care  of  her  petrs        No fever/chills, No photophobia/eye pain/changes in vision,       No chest pain/palpitations, no SOB/cough, no wheeze/stridor, No abdominal pain, No N/V/D (17 Jul 2021 22:20)      PAST MEDICAL & SURGICAL HISTORY:  Migraines    REVIEW OF SYSTEMS:  Constitutional: Denies fever, weight loss, fatigue  Eye: Denies eye pain, visual changes, discharge, blurred vision  ENT: Denies hearing changes, tinnitus, vertigo, sinus congestion, sore throat  Neck: Denies pain or stiffness  Respiratory: Denies cough, wheezing, chills, hemoptysis, shortness of breath, difficulty breathing  Cardiovascular: Denies chest pain, palpitations, dizziness, leg swelling  Gastrointestinal: Denies abdominal pain, nausea, vomiting, hematemesis, diarrhea, constipation, melena, hematochezia  Genitourinary: Denies dysuria, frequency, hematuria, retention, incontinence  Neurological: Denies headaches, memory loss, loss of strength, numbness, tremors  Skin: Denies itching, burning, rashes, lesions   Endocrine: Denies heat or cold intolerance, hair loss  Musculoskeletal: Denies joint pain or swelling, back, extremity pain  Psychiatric: Denies depression, anxiety, mood swings, difficulty sleeping, suicidal ideation  Hematology: Denies easy bruising, bleeding gums  Immunologic: Denies hives or eczema    MEDICATIONS  (STANDING):  aspirin enteric coated 81 milliGRAM(s) Oral daily  atorvastatin 80 milliGRAM(s) Oral at bedtime  heparin   Injectable 5000 Unit(s) SubCutaneous every 12 hours  topiramate 25 milliGRAM(s) Oral daily    MEDICATIONS  (PRN):  acetaminophen   Tablet .. 650 milliGRAM(s) Oral every 6 hours PRN Mild Pain (1 - 3)      Allergies    No Known Allergies    Intolerances    Vital Signs Last 24 Hrs  T(C): 36.4 (21 Jul 2021 13:00), Max: 36.8 (20 Jul 2021 15:46)  T(F): 97.5 (21 Jul 2021 13:00), Max: 98.3 (20 Jul 2021 15:46)  HR: 80 (21 Jul 2021 13:00) (74 - 89)  BP: 122/78 (21 Jul 2021 13:00) (107/72 - 124/86)  RR: 18 (21 Jul 2021 13:00) (18 - 18)  SpO2: 98% (21 Jul 2021 13:00) (96% - 98%)    PHYSICAL EXAM:  GENERAL:   HEAD: Atraumatic, Normocephalic  EYES: PERRL, conjunctiva and sclera clear  ENMT: No tonsillar erythema, exudates, or enlargement; Moist mucous membranes  NECK: Supple, No JVD, Normal thyroid  NERVOUS SYSTEM:  Alert & Oriented X3, Good concentration; Motor Strength 5/5 B/L upper and lower extremities  CHEST/LUNG: Clear to auscultaion bilaterally; No rales, rhonchi, wheezing, or rubs  HEART: Regular rate and rhythm; No murmurs, rubs, or gallops  ABDOMEN: Soft, Nontender, Nondistended; Bowel sounds present  EXTREMITIES:  2+ Peripheral Pulses, no edema  SKIN: No rashes or lesions    LABS:                        13.5   5.30  )-----------( 324      ( 21 Jul 2021 07:18 )             42.2     07-21    136  |  104  |  19  ----------------------------<  101<H>  4.1   |  26  |  1.06    Ca    9.7      21 Jul 2021 07:18    TPro  8.5<H>  /  Alb  3.6  /  TBili  0.6  /  DBili  x   /  AST  9<L>  /  ALT  29  /  AlkPhos  90  07-21          RADIOLOGY & ADDITIONAL STUDIES:  `< from: US Duplex Carotid Arteries Complete, Bilateral (07.21.21 @ 11:38) >    EXAM:  US DPLX CAROTIDS COMPL BI                            PROCEDURE DATE:  07/21/2021          INTERPRETATION:  CLINICAL INFORMATION: CVA    COMPARISON: None available.    TECHNIQUE: Grayscale, color and spectral Doppler examination of both carotid arteries was performed.    FINDINGS:    No elevated velocities or abnormal waveforms are encountered.    Peak systolic velocities are as follows:    RIGHT:  PROX CCA = 100 cm/s  DIST CCA = 80 cm/s  PROX ICA = 51 cm/s  DIST ICA = 60 cm/s  ECA = 69 cm/s    LEFT:  PROX CCA = 100 cm/s  DIST CCA = 87 cm/s  PROX ICA = 80 cm/s  DIST ICA = 50 cm/s  ECA = 77 cm/s    Antegrade flow is noted within both vertebral arteries.    IMPRESSION: No significant hemodynamic stenosis of either carotid artery.    Measurement of carotid stenosis is based on velocity parameters that correlate the residual internal carotid diameter with that of the more distal vessel in accordance with a method such as the [employer] Symptomatic Carotid Endarterectomy Trial (NASCET).      < from: CT Angio Neck w/ IV Cont (07.20.21 @ 15:03) >    EXAM:  CT ANGIO NECK (W)AW IC                            PROCEDURE DATE:  07/20/2021          INTERPRETATION:  EXAM:  CT ANGIO BRAIN (W)AW IC      PROCEDURE DATE:  07/20/2021        INTERPRETATION:  INDICATION: Left MCA infarct.    TECHNIQUE:  A thin section CT study of the head and  neck was conducted during rapid infusion of intravenous contrast (power injected).  In addition to the axial data, reformatted images were generated using a 3D workstation. Rapid AI was used to screen for hemorrhage.  100 cc Omnipaque 350 was administered.    FINDINGS:    AORTIC ARCH no dissection or aneurysm dilatation is noted. Major vessel origins are patent.  COMMON CAROTID ARTERIES: Bilaterally patent with tortuosity  RIGHT BIFURCATION: There is scattered calcified plaque with mild narrowing of the internal and external carotid arteries at their origins.  LEFT BIFURCATION: There is a complex lesion involving the bulb and proximal internal carotid artery along the posterior wall. The lesion is partiallycalcified, and there is a extrinsic invasion and/or small ulceration in the posterior wall plaque. There is a moderate narrowing of the internal carotid at the origin.  INTERNAL CAROTID ARTERIES: Bilaterally patent with tortuosity  VERTEBRALS bilaterally patent  MISCELLANEOUS:  Degenerative changes are noted in the lower cervical spine      BRAIN   a left MCA infarct was identified on MR dated 7/20/2021    The cavernous and supraclinoid carotid arteries are bilaterally patent.    Both anterior cerebral arteries are patent. Both A1 segments are well formed.    Both middle cerebral arteries are bilaterally patent proximally. There is attenuation of distal branches of the left middle cerebral artery in the posterior/inferior division.    The vertebrobasilar system is patent. There is fetal type supply of the left posterior cerebral artery. Both posterior cerebral arteries are patent.    The venous sinuses are patent.      IMPRESSION:      1. Complex posterior wall plaque proximal left internal carotid artery which has an ulcerated component in addition to calcified plaque. Moderate narrowing of the internal carotid at the origin.  2. Mild narrowing right internal/external carotid at the origin. Patent vertebrals.  3. Attenuation of distal left MCA branches corresponding to posterior/inferior division with possible reconstitution. No proximal MCA lesion. This raises the possibility of embolic or thrombotic disease.  4. Patent vertebrobasilar system.

## 2021-07-21 NOTE — PROGRESS NOTE ADULT - SUBJECTIVE AND OBJECTIVE BOX
NEUROLOGY CONSULT PROGRESS NOTE    HPI:  patient is doing well, no complaints    CTA head/neck: ulcerated complex plaque in proximal left ICA with moderate stenosis    MRI brain: acute ischemic infarct in inferior division of left MCA  TTE: EF 60-65%    NEUROLOGICAL EXAM:  T 97.7 F  /70  HR 71  MS: Awake, alert, oriented x 4, language fluent, comprehension intact, naming intact, repetition intact, normal affect  CN: PERRLA, EOMI, visual fields intact, facial sensation intact, face symmetric, hearing intact, no dysarthria, symmetric palatal elevation, tongue midline, symmetric shoulder shrug and SCM strength  Motor: normal bulk, normal tone, power 5/5 in all four extremities, no tremors or fasciculations  Sensation: intact to light touch, vibration sense, proprioception  Reflexes: 2 at biceps, brachioradialis, patella, ankle bilaterally.  Flexor plantar response bilaterally.  No clonus  Coordination: no dysmetria  Gait: normal    NIHSS = 0    Assessment:  57 yo woman with migraine headache, resolved.  Acute ischemic stroke in the inferior division of the left MCA, uncertain etiology.  Pattern on MRI (wedge-shaped cortical/subcortical infarct) can be seen with an embolic stroke.    Stroke risk factors include HTN, HLD.    Ulcerated proximal left ICA plaque with moderate stenosis, which could potentially be the embolic source (artery-to-artery embolus).    Recommendations:  1. Vascular surgery consult (is stenosis in cervical or intracranial portion of ICA?)  2. RIZWANA to visualize left atrial appendage and aortic arch, cardiac monitoring for paroxysmal Afib  3. Antiplatelet therapy: since NIHSS < 3, recommend dual antiplatelet therapy x 21 days (ASA 81mg + Plavix 75mg daily), followed by Aspirin 81mg daily.  If it is decided that anticoagulation is needed, antiplatelet therapy can be discontinued.  4. Statin therapy: Atorvastatin 80mg daily, target LDL < 70  5. Cardica telemetry monitoring  6. Migraine prophylaxis: Topiramate 25m daily  7. Avoid triptans and ergots for acute migraine management    Ford Harden MD  [facility] Department of Neurology  [facility]

## 2021-07-22 ENCOUNTER — TRANSCRIPTION ENCOUNTER (OUTPATIENT)
Age: 56
End: 2021-07-22

## 2021-07-22 LAB
ALBUMIN SERPL ELPH-MCNC: 3.4 G/DL — SIGNIFICANT CHANGE UP (ref 3.3–5)
ALP SERPL-CCNC: 86 U/L — SIGNIFICANT CHANGE UP (ref 40–120)
ALT FLD-CCNC: 29 U/L — SIGNIFICANT CHANGE UP (ref 12–78)
ANION GAP SERPL CALC-SCNC: 7 MMOL/L — SIGNIFICANT CHANGE UP (ref 5–17)
AST SERPL-CCNC: 16 U/L — SIGNIFICANT CHANGE UP (ref 15–37)
BASOPHILS # BLD AUTO: 0.01 K/UL — SIGNIFICANT CHANGE UP (ref 0–0.2)
BASOPHILS NFR BLD AUTO: 0.1 % — SIGNIFICANT CHANGE UP (ref 0–2)
BILIRUB SERPL-MCNC: 0.5 MG/DL — SIGNIFICANT CHANGE UP (ref 0.2–1.2)
BUN SERPL-MCNC: 23 MG/DL — SIGNIFICANT CHANGE UP (ref 7–23)
CALCIUM SERPL-MCNC: 9.4 MG/DL — SIGNIFICANT CHANGE UP (ref 8.5–10.1)
CHLORIDE SERPL-SCNC: 108 MMOL/L — SIGNIFICANT CHANGE UP (ref 96–108)
CO2 SERPL-SCNC: 26 MMOL/L — SIGNIFICANT CHANGE UP (ref 22–31)
CREAT SERPL-MCNC: 1.06 MG/DL — SIGNIFICANT CHANGE UP (ref 0.5–1.3)
EOSINOPHIL # BLD AUTO: 0.11 K/UL — SIGNIFICANT CHANGE UP (ref 0–0.5)
EOSINOPHIL NFR BLD AUTO: 0.9 % — SIGNIFICANT CHANGE UP (ref 0–6)
FLUAV AG NPH QL: SIGNIFICANT CHANGE UP
FLUBV AG NPH QL: SIGNIFICANT CHANGE UP
GLUCOSE SERPL-MCNC: 103 MG/DL — HIGH (ref 70–99)
HCT VFR BLD CALC: 40.2 % — SIGNIFICANT CHANGE UP (ref 34.5–45)
HGB BLD-MCNC: 12.5 G/DL — SIGNIFICANT CHANGE UP (ref 11.5–15.5)
IMM GRANULOCYTES NFR BLD AUTO: 0.4 % — SIGNIFICANT CHANGE UP (ref 0–1.5)
LACTATE SERPL-SCNC: 2 MMOL/L — SIGNIFICANT CHANGE UP (ref 0.7–2)
LYMPHOCYTES # BLD AUTO: 2.73 K/UL — SIGNIFICANT CHANGE UP (ref 1–3.3)
LYMPHOCYTES # BLD AUTO: 22.5 % — SIGNIFICANT CHANGE UP (ref 13–44)
MAGNESIUM SERPL-MCNC: 2.4 MG/DL — SIGNIFICANT CHANGE UP (ref 1.6–2.6)
MCHC RBC-ENTMCNC: 27.3 PG — SIGNIFICANT CHANGE UP (ref 27–34)
MCHC RBC-ENTMCNC: 31.1 GM/DL — LOW (ref 32–36)
MCV RBC AUTO: 87.8 FL — SIGNIFICANT CHANGE UP (ref 80–100)
MONOCYTES # BLD AUTO: 0.65 K/UL — SIGNIFICANT CHANGE UP (ref 0–0.9)
MONOCYTES NFR BLD AUTO: 5.3 % — SIGNIFICANT CHANGE UP (ref 2–14)
NEUTROPHILS # BLD AUTO: 8.6 K/UL — HIGH (ref 1.8–7.4)
NEUTROPHILS NFR BLD AUTO: 70.8 % — SIGNIFICANT CHANGE UP (ref 43–77)
NRBC # BLD: 0 /100 WBCS — SIGNIFICANT CHANGE UP (ref 0–0)
PHOSPHATE SERPL-MCNC: 4.3 MG/DL — SIGNIFICANT CHANGE UP (ref 2.5–4.5)
PLATELET # BLD AUTO: 309 K/UL — SIGNIFICANT CHANGE UP (ref 150–400)
POTASSIUM SERPL-MCNC: 4.7 MMOL/L — SIGNIFICANT CHANGE UP (ref 3.5–5.3)
POTASSIUM SERPL-SCNC: 4.7 MMOL/L — SIGNIFICANT CHANGE UP (ref 3.5–5.3)
PROCALCITONIN SERPL-MCNC: 0.16 NG/ML — HIGH (ref 0.02–0.1)
PROT SERPL-MCNC: 8 GM/DL — SIGNIFICANT CHANGE UP (ref 6–8.3)
RAPID RVP RESULT: SIGNIFICANT CHANGE UP
RBC # BLD: 4.58 M/UL — SIGNIFICANT CHANGE UP (ref 3.8–5.2)
RBC # FLD: 13.6 % — SIGNIFICANT CHANGE UP (ref 10.3–14.5)
SARS-COV-2 RNA SPEC QL NAA+PROBE: SIGNIFICANT CHANGE UP
SARS-COV-2 RNA SPEC QL NAA+PROBE: SIGNIFICANT CHANGE UP
SODIUM SERPL-SCNC: 141 MMOL/L — SIGNIFICANT CHANGE UP (ref 135–145)
WBC # BLD: 12.15 K/UL — HIGH (ref 3.8–10.5)
WBC # FLD AUTO: 12.15 K/UL — HIGH (ref 3.8–10.5)

## 2021-07-22 PROCEDURE — 99254 IP/OBS CNSLTJ NEW/EST MOD 60: CPT

## 2021-07-22 PROCEDURE — 71046 X-RAY EXAM CHEST 2 VIEWS: CPT | Mod: 26

## 2021-07-22 PROCEDURE — 99232 SBSQ HOSP IP/OBS MODERATE 35: CPT

## 2021-07-22 PROCEDURE — 93970 EXTREMITY STUDY: CPT | Mod: 26

## 2021-07-22 RX ORDER — CEFTRIAXONE 500 MG/1
1000 INJECTION, POWDER, FOR SOLUTION INTRAMUSCULAR; INTRAVENOUS EVERY 24 HOURS
Refills: 0 | Status: DISCONTINUED | OUTPATIENT
Start: 2021-07-22 | End: 2021-07-23

## 2021-07-22 RX ORDER — VANCOMYCIN HCL 1 G
1250 VIAL (EA) INTRAVENOUS EVERY 12 HOURS
Refills: 0 | Status: DISCONTINUED | OUTPATIENT
Start: 2021-07-22 | End: 2021-07-22

## 2021-07-22 RX ORDER — KETOROLAC TROMETHAMINE 30 MG/ML
15 SYRINGE (ML) INJECTION ONCE
Refills: 0 | Status: DISCONTINUED | OUTPATIENT
Start: 2021-07-22 | End: 2021-07-23

## 2021-07-22 RX ORDER — SODIUM CHLORIDE 9 MG/ML
1000 INJECTION INTRAMUSCULAR; INTRAVENOUS; SUBCUTANEOUS ONCE
Refills: 0 | Status: COMPLETED | OUTPATIENT
Start: 2021-07-22 | End: 2021-07-22

## 2021-07-22 RX ORDER — ENOXAPARIN SODIUM 100 MG/ML
110 INJECTION SUBCUTANEOUS
Refills: 0 | Status: DISCONTINUED | OUTPATIENT
Start: 2021-07-22 | End: 2021-07-23

## 2021-07-22 RX ADMIN — Medication 650 MILLIGRAM(S): at 00:12

## 2021-07-22 RX ADMIN — ENOXAPARIN SODIUM 110 MILLIGRAM(S): 100 INJECTION SUBCUTANEOUS at 00:50

## 2021-07-22 RX ADMIN — ENOXAPARIN SODIUM 110 MILLIGRAM(S): 100 INJECTION SUBCUTANEOUS at 17:30

## 2021-07-22 RX ADMIN — SODIUM CHLORIDE 1000 MILLILITER(S): 9 INJECTION INTRAMUSCULAR; INTRAVENOUS; SUBCUTANEOUS at 00:51

## 2021-07-22 RX ADMIN — Medication 25 MILLIGRAM(S): at 12:07

## 2021-07-22 RX ADMIN — Medication 81 MILLIGRAM(S): at 12:06

## 2021-07-22 RX ADMIN — ATORVASTATIN CALCIUM 80 MILLIGRAM(S): 80 TABLET, FILM COATED ORAL at 21:36

## 2021-07-22 RX ADMIN — Medication 250 MILLIGRAM(S): at 00:20

## 2021-07-22 RX ADMIN — CEFTRIAXONE 100 MILLIGRAM(S): 500 INJECTION, POWDER, FOR SOLUTION INTRAMUSCULAR; INTRAVENOUS at 23:23

## 2021-07-22 NOTE — SWALLOW BEDSIDE ASSESSMENT ADULT - COMMENTS
MRIhead no contrast 7/17/2021 IMPRESSION:Acute infarction within the left MCA territory as described. There is no associated acute intracranial hemorrhage. There is evidence of possible cortical laminar necrosis.    CXR7/17/2021 IMPRESSION:No sign of acute pulmonary disease.

## 2021-07-22 NOTE — SWALLOW BEDSIDE ASSESSMENT ADULT - H & P REVIEW
57 y/o F with PMH migraines, presents for admission for subacute CVA. Pt reports had Rt occipital and rt temporal headache x 6 days, and noted she had trouble findings her words. Pt reports the trouble findings words improved, but still present mildly. HA still present. Pt was seen earlier this morning and CT ntoed with Lt temporal and occipital CVA and signed out AMA./yes

## 2021-07-22 NOTE — CHART NOTE - NSCHARTNOTEFT_GEN_A_CORE
Called by RN for pt c/o "flushed feeling" all over, tachycardia to 130s  Pt seen resting in bed, c/o "feeling hot and flushed", denies other complaints including cp, sob, cough, nasal congestion, dysuria    /64   RR 18  Tmax 102F rectal  CV: reg, tachy  lungs: cta bilat  abd: soft, ntnd, nabsx4  ext: no edema, no ttp, no homans/cord  neuro: aaox3, no focal deficit    EKG: ST 119bpm, LAE    Stat labs:                        13.4   14.24 )-----------( 326      ( 21 Jul 2021 23:30 )             40.6     Lactate, Blood: 1.6 mmol/L (07-21 @ 23:30)  RVP: pending  UA, UCX, BCX collected    CXR 7/17: no i/e    This is a 57yo F admitted 7/17 with acute embolic cva, s/p RIZWANA today revealing PFO, now with fever to 102F rectal.  - pancultured as above  - acetaminophen q6hr prn first dose given  - empiric abx CFTX and vanco after cx collected  - tx dose lovenox empirically 110 mg bid first dose now  - pt seen by and case d/w Dr. Bates, agrees w/ above  - continue to monitor Called by RN for pt c/o "flushed feeling" all over, tachycardia to 130s  Pt seen resting in bed, c/o "feeling hot and flushed", denies other complaints including cp, sob, cough, nasal congestion, dysuria    /64   RR 18  Tmax 102F rectal  CV: reg, tachy  lungs: cta bilat  abd: soft, ntnd, nabsx4  ext: no edema, no ttp, no homans/cord  neuro: aaox3, no focal deficit    EKG: ST 119bpm, LAE    Stat labs:                        13.4   14.24 )-----------( 326      ( 21 Jul 2021 23:30 )             40.6     Lactate, Blood: 1.6 mmol/L (07-21 @ 23:30)  RVP: pending  UA, UCX, BCX collected    CXR 7/17: no i/e    This is a 57yo F admitted 7/17 with acute embolic cva, s/p RIZWANA today revealing PFO, now with fever to 102F rectal.  - pancultured as above  - acetaminophen q6hr prn first dose given  - empiric abx CFTX and vanco after cx collected  - tx dose lovenox empirically 110 mg bid first dose now  - IVF bolus for soft BP  - pt seen by and case d/w Dr. Bates, agrees w/ above  - continue to monitor Called by RN for pt c/o "flushed feeling" all over, tachycardia to 130s  Pt seen resting in bed, c/o "feeling hot and flushed", denies other complaints including cp, sob, cough, nasal congestion, dysuria    /64   RR 18  Tmax 102F rectal  CV: reg, tachy  lungs: cta bilat  abd: soft, ntnd, nabsx4  ext: no edema, no ttp, no homans/cord  neuro: aaox3, no focal deficit    EKG: ST 119bpm, LAE    Stat labs:                        13.4   14.24 )-----------( 326      ( 21 Jul 2021 23:30 )             40.6     Lactate, Blood: 1.6 mmol/L (07-21 @ 23:30)  RVP: pending  UA, UCX, BCX collected    CXR 7/17: no i/e    This is a 55yo F admitted 7/17 with acute embolic cva, s/p RIZWANA today revealing PFO, now with fever to 102F rectal.  - pancultured as above  - acetaminophen q6hr prn first dose given  - empiric abx CFTX and vanco after cx collected  - tx dose lovenox empirically 110 mg bid first dose now  - IVF bolus for soft BP  - pt seen by and case d/w Dr. Bates, agrees w/ above.   - continue to monitor

## 2021-07-22 NOTE — SWALLOW BEDSIDE ASSESSMENT ADULT - ORAL PHASE
Medical Week 2 Survey      Responses   Facility patient discharged from?  [facility]   Does the patient have one of the following disease processes/diagnoses(primary or secondary)?  Other   Week 2 attempt successful?  No   Revoke  Readmitted          La Hopkins RN   Within functional limits

## 2021-07-22 NOTE — CONSULT NOTE ADULT - SUBJECTIVE AND OBJECTIVE BOX
Request for consultation received  Chart/Labs/Imaging reviewed  Assessment to follow.  Jd Reyes MD  [phone]  ------------------------------  Request for consultation received  Chart/Labs/Imaging reviewed  Assessment to follow.  Jd Reyes MD  [phone]  ------------------------------   [facility]  Division of Infectious Diseases  [phone]    NOHEMI FITZGERALD  56y, Female  [id number]    HPI--  56F with hx migraine headaches, L knee surgery for meniscal tear, asthma, presented with headache and dysarthria and found to have acute L MCA CVA. Patient underwent RIZWANA yesterday, no written report but apparently PFO encountered. After procedure yesterday evening/early this am patient felt warm and was noted to have fever with tachycardia, leukocytosis and relative hypotension.    Presently patient without complaints. Still has some dysarthria but it is getting better.     PMH/PSH--  Migraines    No significant past surgical history        Allergies--  No Known Allergies      Medications--  Antibiotics: cefTRIAXone   IVPB 1000 milliGRAM(s) IV Intermittent every 24 hours  vancomycin  IVPB 1250 milliGRAM(s) IV Intermittent every 12 hours    Immunologic:   Other: acetaminophen   Tablet .. 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    Review of Systems:  A >=10-point review of systems was obtained.     Pertinent positives and negatives--  Constitutional: No further fevers. No Chills. No Rigors.   Eyes: denies.   ENMT: HA yesterday, none today.  Cardiovascular: No chest pain. No palpitations.  Respiratory: No shortness of breath. No cough.  Gastrointestinal: No nausea or vomiting. No diarrhea or constipation.   Genitourinary: denies.   Musculoskeletal: sense of neck stiffness ongoing for about 3 weeks. No back pain, myalgia or arthralgia  Skin: denies.   Neurologic: as above  Psychiatric: Pleasant. Appropriate affect.  Endocrine: denies.   Heme/Lymphatic: denies.   Allergy/Immunologic: denies.     Review of systems otherwise negative except as previously noted.    Physical Exam--  Vital Signs: T(F): 99.9 (21 @ 05:16), Max: 102 (21 @ 22:23)  HR: 80 (21 @ 05:16)  BP: 122/74 (21 @ 05:16)  RR: 17 (21 @ 05:16)  SpO2: 97% (21 @ 05:16)  Wt(kg): --  General: Nontoxic-appearing Female in no acute distress.  HEENT: AT/NC. PERRL. EOMI. Anicteric. Conjunctiva pink and moist. Oropharynx clear. Dentition fair.  Neck: Not rigid. No sense of mass.  Nodes: None palpable.  Lungs: Scant crackles R base, otherwise clear  Heart: Regular rate and rhythm. No Murmur. No rub. No gallop. No palpable thrill.  Abdomen: Bowel sounds present and normoactive. Soft. Nondistended. Nontender. No sense of mass. No organomegaly. Obese.  Back: No spinal tenderness. No costovertebral angle tenderness.   Extremities: No cyanosis or clubbing. No edema. L AC fossa with palpable cord at former IV site but not tender and no surrounding inflammatory changes  Skin: Warm. Dry. Good turgor. No rash. No vasculitic stigmata.  Psychiatric: Appropriate affect and mood for situation.       Laboratory & Imaging Data--  CBC                        12.5   12.15 )-----------( 309      ( 2021 07:33 )             40.2   WBC trend  WBC Count: 12.15 K/uL (21 @ 07:33)  WBC Count: 14.24 K/uL (21 @ 23:30)  WBC Count: 5.30 K/uL (21 @ 07:18)  WBC Count: 4.39 K/uL (21 @ 06:26)  WBC Count: 4.27 K/uL (21 @ 08:01)  WBC Count: 6.52 K/uL (21 @ 16:48)      Chemistries      141  |  108  |  23  ----------------------------<  103<H>  4.7   |  26  |  1.06    Ca    9.4      2021 07:33  Phos  4.3       Mg     2.4         Lactate, Blood: 2.0 mmol/L (21 @ 07:33)  Lactate, Blood: 1.6 mmol/L (21 @ 23:30)      TPro  8.0  /  Alb  3.4  /  TBili  0.5  /  DBili  x   /  AST  16  /  ALT  29  /  AlkPhos  86      Anti-Nuclear Antibody (21 @ 14:07)    Anti Nuclear Factor Titer: 1:1280    CELIO Pattern: Speckled    Thyroperoxidase Antibody: 2473.0: Test repeated. IU/mL (21 @ 09:12)  Thyroglobulin Antibodies: 7199.0: Test repeated. IU/mL (21 @ 09:12)  Free Thyroxine, Serum: 1.4 ng/dL (21 @ 09:14)  Thyroid Stimulating Hormone, Serum: <0.005 uIU/mL (21 @ 06:22)    Procalcitonin, Serum: 0.16 (21 @ 11:01)    A1C with Estimated Average Glucose Result: 5.6: Method: Immunoassay       Reference Range                4.0-5.6%       High risk (prediabetic)        5.7-6.4%       Diabetic, diagnostic             >=6.5%       ADA diabetic treatment goal       <7.0%  The Hemoglobin A1c testing is NGSP-certified.Reference ranges are based  upon the 2010 recommendations of  the American Diabetes Association.  Interpretation may vary for children  and adolescents. % (21 @ 14:31)    Respiratory Viral Panel with COVID-19 by GLORIA (21 @ 23:32)    Rapid RVP Result: [facility]    SARS-CoV-2: NotDete: This Respiratory Panel uses polymerase chain reaction (PCR) to detect for  adenovirus; coronavirus (HKU1, NL63, 229E, OC43); human metapneumovirus  (hMPV); human enterovirus/rhinovirus (Entero/RV); influenza A; influenza  A/H1; influenza A/H3; influenza A/H1-2009; influenza B; parainfluenza  viruses 1, 2, 3, 4; respiratory syncytial virus; Mycoplasma pneumoniae;  Chlamydophila pneumoniae; and SARS-CoV-2.    COVID-19 Stevo Domain Antibody (21 @ 09:10)    COVID-19 Stevo Domain Antibody Result: >250.00: Roche ECLIA Total AB (JAGDEEP)      Culture Data  None    < from: TTE Echo Complete w/o Contrast w/ Doppler (21 @ 14:18) >  PHYSICIAN INTERPRETATION:  Left Ventricle: Normal left ventricular size and wall thicknesses, with normal systolic and diastolic function.  Left ventricular ejection fraction, by visual estimation, is 60 to 65%.  Right Ventricle: Normal right ventricular size and function.  Left Atrium: The left atrium is normal in size.  Right Atrium: The right atrium is normal in size.  Pericardium: There is no evidence of pericardial effusion.  Mitral Valve: Structurally normal mitralvalve, with normal leaflet excursion. Trace mitral valve regurgitation is seen.  Tricuspid Valve: Structurally normal tricuspid valve, with normal leaflet excursion. Mild tricuspid regurgitation is visualized.  Aortic Valve: Normal trileaflet aortic valve with normal opening. Peak transaortic gradient equals 6.5 mmHg, mean transaortic gradient equals 3.3 mmHg, the calculated aortic valve area equals 2.90 cm² by the continuity equation consistent with normally opening aortic valve. No evidence of aortic valve regurgitation is seen.  Pulmonic Valve: Structurally normal pulmonic valve, with normal leaflet excursion. No indication of pulmonic valve regurgitation.  Aorta: The aortic root and ascending aorta are structurally normal, with no evidence of dilitation.  Pulmonary Artery: The main pulmonary artery is normal in size.      Summary:   1. Left ventricular ejection fraction, by visual estimation, is 60 to 65%.   2. There is mild concentric left ventricular hypertrophy.   3. Trace mitral valve regurgitation.   4. Mild tricuspid regurgitation.   5. No evidence of any thrombus.    < end of copied text >    < from: MR Head No Cont (21 @ 11:53) >    EXAM:  MR BRAIN                            PROCEDURE DATE:  2021          INTERPRETATION:  STUDY: MR BRAIN WITHOUT CONTRAST.    CLINICAL INDICATION: Stroke    TECHNIQUE: Multiplanar, multisequence MR imaging of the brain was performed.    COMPARISON: CT head 2021    FINDINGS:  There is FLAIR hyperintensity and sulcal effacement predominantly involving the left parietal, posterior left temporal and left occipital lobes. There is corresponding restricted diffusion consistent with acuteinfarction. There are patchy areas of gyriform T1 hyperintense signal within the involved territory which may represent cortical laminar necrosis. There is no associated acute intracranial hemorrhage.    The ventricles and sulci are age appropriate .There is no evidence of mass. There is no extra axial collection. No midline shift.    There is normal flow-void within major cranial vessels suggestive of their patency.    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 The paranasal sinuses are predominantly clear.. The mastoid air cells are predominantly clear.    IMPRESSION:  Acute infarction within the left MCA territory as described. There is no associated acute intracranial hemorrhage. There is evidence of possible cortical laminar necrosis.    --- End of Report ---    DAWSON SCHMITT MD; Attending Radiologist  This document has been electronically signed. 2021 12:25PM    < end of copied text >      < from: CT Angio Head w/ IV Cont (21 @ 14:55) >    IMPRESSION:      1. Complex posterior wall plaque proximal left internal carotid artery which has an ulcerated component in addition to calcified plaque. Moderate narrowing of the internal carotid at the origin.  2. Mild narrowing right internal/external carotid at the origin. Patent vertebrals.  3. Attenuation of distal left MCA branches corresponding to posterior/inferior division with possible reconstitution. No proximal MCA lesion. This raises the possibility of embolic or thrombotic disease.  4. Patent vertebrobasilar system.    < end of copied text >    < from: US Duplex Venous Lower Ext Complete, Bilateral (21 @ 11:28) >    EXAM:  US DPLX LWR EXT VEINS COMPL BI                            PROCEDURE DATE:  2021          INTERPRETATION:  CLINICAL INFORMATION: CVA.    COMPARISON: None available.    TECHNIQUE: Duplex sonography of the BILATERAL LOWER extremity veins with color and spectral Doppler, with and without compression.    FINDINGS:    RIGHT:  Normal compressibility of the RIGHT common femoral, femoral and popliteal veins.  Doppler examination shows normal spontaneous and phasic flow.  No RIGHT calf vein thrombosis is detected.    LEFT:  Normal compressibility of the LEFT common femoral, femoral and popliteal veins.  Doppler examination shows normal spontaneous and phasic flow.  No LEFT calf vein thrombosis is detected.    There is a cyst in the left popliteal fossa measuring 3.2 cm x 8mm x 2.4 cm.    IMPRESSION:    No evidence of deep venous thrombosis in either lower extremity.    Small left Baker's cyst.  --- End of Report ---  GUI GALLEGOS MD; Attending Radiologist  This document has been electronically signed. 2021 11:49AM    < end of copied text >      < from: US Duplex Carotid Arteries Complete, Bilateral (21 @ 11:38) >  IMPRESSION: No significant hemodynamic stenosis of either carotid artery.      < end of copied text >

## 2021-07-22 NOTE — PROGRESS NOTE ADULT - SUBJECTIVE AND OBJECTIVE BOX
SUBJECTIVE / OVERNIGHT EVENTS: pt denies chest pain, shortness of breath , ha    MEDICATIONS  (STANDING):  aspirin enteric coated 81 milliGRAM(s) Oral daily  atorvastatin 80 milliGRAM(s) Oral at bedtime  cefTRIAXone   IVPB 1000 milliGRAM(s) IV Intermittent every 24 hours  enoxaparin Injectable 110 milliGRAM(s) SubCutaneous two times a day  topiramate 25 milliGRAM(s) Oral daily    MEDICATIONS  (PRN):  acetaminophen   Tablet .. 650 milliGRAM(s) Oral every 6 hours PRN Temp greater or equal to 38C (100.4F), Mild Pain (1 - 3)  ketorolac   Injectable 15 milliGRAM(s) IV Push once PRN Moderate Pain (4 - 6)    Vital Signs Last 24 Hrs  T(C): 36.7 (22 Jul 2021 16:15), Max: 38.3 (22 Jul 2021 00:09)  T(F): 98.1 (22 Jul 2021 16:15), Max: 100.9 (22 Jul 2021 00:09)  HR: 88 (22 Jul 2021 16:15) (57 - 97)  BP: 119/74 (22 Jul 2021 16:15) (100/64 - 145/82)  BP(mean): --  RR: 18 (22 Jul 2021 16:15) (17 - 18)  SpO2: 98% (22 Jul 2021 16:15) (97% - 98%)    Constitutional: No fever, fatigue  Skin: No rash.  Eyes: No recent vision problems or eye pain.  ENT: No congestion, ear pain, or sore throat.  Cardiovascular: No chest pain or palpation.  Respiratory: No cough, shortness of breath, congestion, or wheezing.  Gastrointestinal: No abdominal pain, nausea, vomiting, or diarrhea.  Genitourinary: No dysuria.  Musculoskeletal: No joint swelling.  Neurologic: No headache.    PHYSICAL EXAM:  GENERAL: NAD  EYES: EOMI, PERRLA  NECK: Supple, No JVD  CHEST/LUNG: cta reed  HEART:  S1 , S2 +  ABDOMEN: soft , bs+  EXTREMITIES: no edema   NEUROLOGY:alert awake    LABS:  07-22    141  |  108  |  23  ----------------------------<  103<H>  4.7   |  26  |  1.06    Ca    9.4      22 Jul 2021 07:33  Phos  4.3     07-22  Mg     2.4     07-22    TPro  8.0  /  Alb  3.4  /  TBili  0.5  /  DBili      /  AST  16  /  ALT  29  /  AlkPhos  86  07-22    Creatinine Trend: 1.06 <--, 1.06 <--, 0.93 <--, 1.07 <--, 1.08 <--                        12.5   12.15 )-----------( 309      ( 22 Jul 2021 07:33 )             40.2     Urine Studies:            LIVER FUNCTIONS - ( 22 Jul 2021 07:33 )  Alb: 3.4 g/dL / Pro: 8.0 gm/dL / ALK PHOS: 86 U/L / ALT: 29 U/L / AST: 16 U/L / GGT: x             LIVER FUNCTIONS - ( 21 Jul 2021 07:18 )  Alb: 3.6 g/dL / Pro: 8.5 gm/dL / ALK PHOS: 90 U/L / ALT: 29 U/L / AST: 9 U/L / GGT: x

## 2021-07-22 NOTE — DISCHARGE NOTE PROVIDER - NSDCFUADDAPPT_GEN_ALL_CORE_FT
Patient with PFO on RIZWANA. To follow up with Dr Henriquez, Cardiologist in 1 week after discharge to be evaluated by CT for closure of PFO.

## 2021-07-22 NOTE — PROGRESS NOTE ADULT - SUBJECTIVE AND OBJECTIVE BOX
Patient is a 56y old  Female who presents with a chief complaint of cva (22 Jul 2021 12:51)      Interval History: better clinically   subclinical hpet    MEDICATIONS  (STANDING):  aspirin enteric coated 81 milliGRAM(s) Oral daily  atorvastatin 80 milliGRAM(s) Oral at bedtime  cefTRIAXone   IVPB 1000 milliGRAM(s) IV Intermittent every 24 hours  enoxaparin Injectable 110 milliGRAM(s) SubCutaneous two times a day  topiramate 25 milliGRAM(s) Oral daily    MEDICATIONS  (PRN):  acetaminophen   Tablet .. 650 milliGRAM(s) Oral every 6 hours PRN Temp greater or equal to 38C (100.4F), Mild Pain (1 - 3)  ketorolac   Injectable 15 milliGRAM(s) IV Push once PRN Moderate Pain (4 - 6)      Allergies    No Known Allergies    Intolerances        REVIEW OF SYSTEMS:  CONSTITUTIONAL: no changes  EYES: No eye pain, visual disturbances, or discharge  ENMT:  No difficulty hearing, No sinus or throat pain  NECK: No pain or stiffness  RESPIRATORY: No cough, wheezing, chills or hemoptysis; No shortness of breath  CARDIOVASCULAR: No chest pain, palpitations or leg swelling  GASTROINTESTINAL: No abdominal or epigastric pain. No nausea, vomiting, or hematemesis; No diarrhea or constipation. No melena or hematochezia.  GENITOURINARY: No dysuria, frequency, hematuria, or incontinence  NEUROLOGICAL: No headaches, memory loss, loss of strength, numbness, or tremors  SKIN: No itching, burning, rashes, or lesions   ENDOCRINE: No heat or cold intolerance; No hair loss  MUSCULOSKELETAL: No joint pain or swelling; No muscle, back, or extremity pain  PSYCHIATRIC: No depression, anxiety, mood swings, or difficulty sleeping  HEME/LYMPH: No easy bruising, or bleeding gums  ALLERY AND IMMUNOLOGIC: No hives or eczema    Vital Signs Last 24 Hrs  T(C): 36.7 (22 Jul 2021 16:15), Max: 38.3 (22 Jul 2021 00:09)  T(F): 98.1 (22 Jul 2021 16:15), Max: 100.9 (22 Jul 2021 00:09)  HR: 88 (22 Jul 2021 16:15) (57 - 97)  BP: 119/74 (22 Jul 2021 16:15) (100/64 - 145/82)  BP(mean): --  RR: 18 (22 Jul 2021 16:15) (17 - 18)  SpO2: 98% (22 Jul 2021 16:15) (97% - 98%)    PHYSICAL EXAM:  GENERAL:   HEAD: Atraumatic, Normocephalic  EYES: PERRLA, conjunctiva and sclera clear  ENMT: No tonsillar erythema, exudates, or enlargement; Moist mucous membranes, Good dentition, No lesions  NECK: Supple, No JVD, Normal thyroid  NERVOUS SYSTEM:  Alert & Oriented X3, Good concentration; Motor Strength 5/5 B/L upper and lower extremities  CHEST/LUNG: Clear to auscultaion bilaterally; No rales, rhonchi, wheezing, or rubs  HEART: Regular rate and rhythm; No murmurs, rubs, or gallops  ABDOMEN: Soft, Nontender, Nondistended; Bowel sounds present  EXTREMITIES:  2+ Peripheral Pulses, no edema  SKIN: No rashes or lesions    LABS:        CAPILLARY BLOOD GLUCOSE        Lipid panel:           Thyroid:  Diabetes Tests:  Parathyroid Panel:  Adrenals:  RADIOLOGY & ADDITIONAL TESTS:    Imaging Personally Reviewed:  [ ] YES  [ ] NO    Consultant(s) Notes Reviewed:  [ ] YES  [ ] NO    Care Discussed with Consultants/Other Providers [ ] YES  [ ] NO

## 2021-07-22 NOTE — SWALLOW BEDSIDE ASSESSMENT ADULT - SLP GENERAL OBSERVATIONS
pt seen bedside, alert and oriented x4. pt responded to orientation/autobiographical questions with good accuracy, she verbalized needs and was able to follow one step directions. noted WNL vocal quality and good speech intelligibility.

## 2021-07-22 NOTE — CONSULT NOTE ADULT - ASSESSMENT
The chart has been reviewed but the patient has not yet been examined.  Full note to follow.
56y old  Female who presents with a PMHx of migraines, admitted with CVA with CT carotid showing complex posterior wall plaque proximal left internal carotid artery. Carotid US without significant hemodynamic stenosis.     No surgical intervention recommended at this time  Per carotid ultrasound no significant hemodynamic stenosis  Recommend repeat carotid doppler in 6 months
suppressed TSH
R/O Sepsis (fever, tachycardia, leukocytosis in the setting of potential infection)  Scant crackles at R lung base, at risk for aspiration with abolition of gag reflex and sedation for the procedure  Has no respiratory complaints however and is comfortable on RA  Aspiration could also be chemical in nature  Atelectasis is in DDx as a noninfectious cause of fever here  Phelbitis R AC fossa unlikely at present to represent a source of fever  Has markedly pos CELIO, and autoimmune disease can certainly cause fever but onset here acute and but no other findings on exam for lupus/RA etc. at this time.   Has + thyroid autoantibodies and suppressed TSH but T4 is normal. Fever is new doubt primary thyroid issue as cause of fever  No urinary sx to suggest UTI  Presumably RIZWANA without concern of endocarditis (patient did not present with fever, either)  No concern of skin/soft tissue infection  Procalcitonin neither as sensitive or specific as one might hope    Suggestions--  CXR  F/U temp data  Trend WBC  F/U Cx  Stop vancomycin  Role of ceftriaxone TBD    Thank you for the courtesy of this referral.  Jd Reyes MD  Attending Physician  [facility]  Division of Infectious Diseases  [phone]

## 2021-07-22 NOTE — SWALLOW BEDSIDE ASSESSMENT ADULT - SWALLOW EVAL: PATIENT/FAMILY GOALS STATEMENT
pt reported "i've forgotten words", ?word finding deficit "nothing physical" and I'm not very articulate" slight imprecise articulation when SLP clarified

## 2021-07-22 NOTE — CHART NOTE - NSCHARTNOTEFT_GEN_A_CORE
NOHEMI FITZGERALD is a 56y Female with PMH migraines. Also HTN, HLD.  Admitted with c/o right occipital and rt temporal headache x 6 days, and noted she had trouble findings her words.  Was seen initially in ED with a  CT that demonstrated a left temporal and occipital CVA, but she signed out AMA, in order to take care of her pets.  Readmitted. neurology diagnosed acute ischemic stroke of inferior division of left MCA, with pattern suggestive of embolic event.   RIZWANA requested for further evaluation of source of embolism.    RIZWANA was performed in OR and cardiologist, Dr Henriquez informed writer that patient has PFO.  Patient was informed of the findings and instructed to follow up with Dr Henriquez 1 week after discharge for evaluation for closure of PFO with CT Surgery. Patient verbalized understanding and in agreement with the plan.  Follow up information added to discharge instructions.

## 2021-07-22 NOTE — DISCHARGE NOTE PROVIDER - HOSPITAL COURSE
56y Female with PMH migraines. Also HTN, HLD. Admitted with c/o right occipital and rt temporal headache x 6 days, and noted she had trouble findings her words. Was seen initially in ED with a  CT that demonstrated a left temporal and occipital CVA, but she signed out AMA, in order to take care of her pets. Readmitted. neurology diagnosed acute ischemic stroke of inferior division of left MCA, with pattern suggestive of embolic event.   RIZWANA requested for further evaluation of source of embolism. RIZWANA with PFO, pt informed to follow up putpt in 1 week for ambulatory procedure to repair PFO. Pt developed fever 2 nights ago, septic workup neg. Doing well. Dicharge home and follow up with PMD 2 weeks.    56y Female with PMH migraines. Also HTN, HLD. Admitted with c/o right occipital and rt temporal headache x 6 days, and noted she had trouble findings her words. Was seen initially in ED with a  CT that demonstrated a left temporal and occipital CVA, but she signed out AMA, in order to take care of her pets. Readmitted. neurology diagnosed acute ischemic stroke of inferior division of left MCA, with pattern suggestive of embolic event.   RIZWANA requested for further evaluation of source of embolism. RIZWANA with PFO, pt informed to follow up putpt in 1 week for ambulatory procedure to repair PFO. Pt developed fever 2 nights ago, septic workup neg. Doing well. Dicharge home and follow up with PMD 2 weeks. Take Asa and plavix.

## 2021-07-22 NOTE — DISCHARGE NOTE PROVIDER - NSDCMRMEDTOKEN_GEN_ALL_CORE_FT
aspirin 81 mg oral delayed release tablet: 1 tab(s) orally once a day  atorvastatin 80 mg oral tablet: 1 tab(s) orally once a day (at bedtime)  topiramate 25 mg oral tablet: 1 tab(s) orally once a day   aspirin 81 mg oral delayed release tablet: 1 tab(s) orally once a day  atorvastatin 80 mg oral tablet: 1 tab(s) orally once a day (at bedtime)  Plavix 75 mg oral tablet: 1 tab(s) orally once a day   topiramate 25 mg oral tablet: 1 tab(s) orally once a day

## 2021-07-22 NOTE — DISCHARGE NOTE PROVIDER - CARE PROVIDER_API CALL
Pramod Henriquez)  Cardiology; Cardiovascular Disease; Nuclear Cardiology  [address] [place] [address]  Phone: [phone]  Fax: [phone]  Follow Up Time: 1 week

## 2021-07-22 NOTE — PROGRESS NOTE ADULT - SUBJECTIVE AND OBJECTIVE BOX
NEUROLOGY CONSULT PROGRESS NOTE    HPI:  Patient spiked fever overnight, work up is pending.  Vascular surgery reviewed carotid US, no significant stenosis, no indication for surgery  RIZWANA report pending, but notes mention PFO was seen    NEUROLOGICAL EXAM:  T 99.9 F  /74  HR 80  MS: Awake, alert, oriented x 4, language fluent, comprehension intact, naming intact, repetition intact, normal affect  CN: PERRLA, EOMI, visual fields intact, facial sensation intact, face symmetric, hearing intact, no dysarthria, symmetric palatal elevation, tongue midline, symmetric shoulder shrug and SCM strength  Motor: normal bulk, normal tone, power 5/5 in all four extremities, no tremors or fasciculations  Sensation: intact to light touch, vibration sense, proprioception  Reflexes: 2 at biceps, brachioradialis, patella, ankle bilaterally.  Flexor plantar response bilaterally.  No clonus  Coordination: no dysmetria  Gait: normal    NIHSS = 0    Assessment:  57 yo woman with migraine headache, resolved.  Acute ischemic stroke in the inferior division of the left MCA.  Ulcerated proximal left ICA plaque with moderate stenosis, which could potentially be the embolic source (artery-to-artery embolus).    Recommendations:  1. In light of PFO, recommend US lower extrermities to rule out DVT (paradoxical embolus)  2. Antiplatelet therapy: since NIHSS < 3, recommend dual antiplatelet therapy x 21 days (ASA 81mg + Plavix 75mg daily), followed by Aspirin 81mg daily.  If it is decided that anticoagulation is needed, antiplatelet therapy can be discontinued.  3. Statin therapy: Atorvastatin 80mg daily, target LDL < 70  4. Cardica telemetry monitoring  5. Migraine prophylaxis: Topiramate 25m daily  6. Avoid triptans and ergots for acute migraine management  7. Recommend outpatient follow up with cardiology to address PFO and consider further monitoring for PAF    Ford Harden MD  [facility] Department of Neurology  Ep[facility] Center

## 2021-07-22 NOTE — DISCHARGE NOTE PROVIDER - NSDCCPCAREPLAN_GEN_ALL_CORE_FT
PRINCIPAL DISCHARGE DIAGNOSIS  Diagnosis: CVA (cerebral vascular accident)  Assessment and Plan of Treatment: 56y Female with PMH migraines. Also HTN, HLD. Admitted with c/o right occipital and rt temporal headache x 6 days, and noted she had trouble findings her words. Was seen initially in ED with a  CT that demonstrated a left temporal and occipital CVA, but she signed out AMA, in order to take care of her pets. Readmitted. neurology diagnosed acute ischemic stroke of inferior division of left MCA, with pattern suggestive of embolic event.   RIZWANA requested for further evaluation of source of embolism. RIZWANA with PFO, pt informed to follow up putpt in 1 week for ambulatory procedure to repair PFO. Pt developed fever 2 nights ago, septic workup neg. Doing well. Dicharge home and follow up with PMD 2 weeks.

## 2021-07-22 NOTE — DISCHARGE NOTE PROVIDER - CARE PROVIDERS DIRECT ADDRESSES
[email or web address]

## 2021-07-23 ENCOUNTER — TRANSCRIPTION ENCOUNTER (OUTPATIENT)
Age: 56
End: 2021-07-23

## 2021-07-23 VITALS — WEIGHT: 260.15 LBS

## 2021-07-23 LAB
ALBUMIN SERPL ELPH-MCNC: 3.4 G/DL — SIGNIFICANT CHANGE UP (ref 3.3–5)
ALP SERPL-CCNC: 89 U/L — SIGNIFICANT CHANGE UP (ref 40–120)
ALT FLD-CCNC: 33 U/L — SIGNIFICANT CHANGE UP (ref 12–78)
ANION GAP SERPL CALC-SCNC: 7 MMOL/L — SIGNIFICANT CHANGE UP (ref 5–17)
APTT BLD: 36.1 SEC — HIGH (ref 27.5–35.5)
AST SERPL-CCNC: 24 U/L — SIGNIFICANT CHANGE UP (ref 15–37)
BILIRUB SERPL-MCNC: 0.5 MG/DL — SIGNIFICANT CHANGE UP (ref 0.2–1.2)
BUN SERPL-MCNC: 20 MG/DL — SIGNIFICANT CHANGE UP (ref 7–23)
CALCIUM SERPL-MCNC: 9.5 MG/DL — SIGNIFICANT CHANGE UP (ref 8.5–10.1)
CHLORIDE SERPL-SCNC: 109 MMOL/L — HIGH (ref 96–108)
CO2 SERPL-SCNC: 26 MMOL/L — SIGNIFICANT CHANGE UP (ref 22–31)
CREAT SERPL-MCNC: 0.97 MG/DL — SIGNIFICANT CHANGE UP (ref 0.5–1.3)
D DIMER BLD IA.RAPID-MCNC: 200 NG/ML DDU — SIGNIFICANT CHANGE UP
GLUCOSE SERPL-MCNC: 98 MG/DL — SIGNIFICANT CHANGE UP (ref 70–99)
HCT VFR BLD CALC: 38.1 % — SIGNIFICANT CHANGE UP (ref 34.5–45)
HGB BLD-MCNC: 12 G/DL — SIGNIFICANT CHANGE UP (ref 11.5–15.5)
INR BLD: 1.09 RATIO — SIGNIFICANT CHANGE UP (ref 0.88–1.16)
MCHC RBC-ENTMCNC: 27.6 PG — SIGNIFICANT CHANGE UP (ref 27–34)
MCHC RBC-ENTMCNC: 31.5 GM/DL — LOW (ref 32–36)
MCV RBC AUTO: 87.8 FL — SIGNIFICANT CHANGE UP (ref 80–100)
NRBC # BLD: 0 /100 WBCS — SIGNIFICANT CHANGE UP (ref 0–0)
PLATELET # BLD AUTO: 286 K/UL — SIGNIFICANT CHANGE UP (ref 150–400)
POTASSIUM SERPL-MCNC: 4.1 MMOL/L — SIGNIFICANT CHANGE UP (ref 3.5–5.3)
POTASSIUM SERPL-SCNC: 4.1 MMOL/L — SIGNIFICANT CHANGE UP (ref 3.5–5.3)
PROT SERPL-MCNC: 7.9 GM/DL — SIGNIFICANT CHANGE UP (ref 6–8.3)
PROTHROM AB SERPL-ACNC: 12.6 SEC — SIGNIFICANT CHANGE UP (ref 10.6–13.6)
RBC # BLD: 4.34 M/UL — SIGNIFICANT CHANGE UP (ref 3.8–5.2)
RBC # FLD: 13.6 % — SIGNIFICANT CHANGE UP (ref 10.3–14.5)
SODIUM SERPL-SCNC: 142 MMOL/L — SIGNIFICANT CHANGE UP (ref 135–145)
WBC # BLD: 8.59 K/UL — SIGNIFICANT CHANGE UP (ref 3.8–10.5)
WBC # FLD AUTO: 8.59 K/UL — SIGNIFICANT CHANGE UP (ref 3.8–10.5)

## 2021-07-23 RX ORDER — TOPIRAMATE 25 MG
1 TABLET ORAL
Qty: 30 | Refills: 0
Start: 2021-07-23 | End: 2021-08-21

## 2021-07-23 RX ORDER — ATORVASTATIN CALCIUM 80 MG/1
1 TABLET, FILM COATED ORAL
Qty: 30 | Refills: 0
Start: 2021-07-23 | End: 2021-08-21

## 2021-07-23 RX ORDER — CLOPIDOGREL BISULFATE 75 MG/1
1 TABLET, FILM COATED ORAL
Qty: 30 | Refills: 0
Start: 2021-07-23 | End: 2021-08-21

## 2021-07-23 RX ORDER — ASPIRIN/CALCIUM CARB/MAGNESIUM 324 MG
1 TABLET ORAL
Qty: 30 | Refills: 0
Start: 2021-07-23 | End: 2021-08-21

## 2021-07-23 RX ADMIN — Medication 25 MILLIGRAM(S): at 11:59

## 2021-07-23 RX ADMIN — ENOXAPARIN SODIUM 110 MILLIGRAM(S): 100 INJECTION SUBCUTANEOUS at 05:12

## 2021-07-23 RX ADMIN — Medication 81 MILLIGRAM(S): at 11:58

## 2021-07-23 NOTE — CHART NOTE - NSCHARTNOTEFT_GEN_A_CORE
Work Letter     To Whom It May Concern,    Please excuse Ms Kianna Kimball from work, as she was admitted on July 17, 2021 and treated in [facility].   Please excuse at work.  Any further questions or concerns please use contact info below.    Shoaib Faria PA-C  Internal Medicine  [address] [place] [address] [phone] Work Letter     To Whom It May Concern,    Please excuse Ms Kianna Kimball from work, as she was admitted on July 17, 2021 and treated in [facility].   Please excuse at work she is being discharged today July 23, 2021.  Any further questions or concerns please use contact info below.    Shoaib Faria PA-C  Internal Medicine  [address] [place] [id number] [phone]

## 2021-07-23 NOTE — PROGRESS NOTE ADULT - ASSESSMENT
55 y/o F     with PMH migraines,               Pt  c/o  right  occipital and rt temporal headache x 6 days, and noted she had trouble findings her words.        Pt reports the trouble findings words improved, but still present mildly.        Pt was seen earlier this morning  by Er  and CT ,showed  Lt temporal and occipital CVA and pt signed out AMA., to  take  care of  he r pets/ 9  cats/  1  dog       pt now returns  to er  for admission         *  s/p headaches/  word  finding  difficulty, all have  resolbed        Ct head, acute to  sub acute  infarct, in left temporal lobe/ left  occipital  lobe          Neuro   called by  er          on asa/  lipitor 80 mg             Echo ordered           MRI brain ordered    *   bmi  is  37             on dvt ppx/ s/q  heparin         labs/ lipid  profile  in am     ra< from: CT Head No Cont (07.17.21 @ 16:21) >  MPRESSION:  HEAD CT: Acute or subacute infarct suspected along mid to posterior left temporal lobe and possibly also upper left occipital lobe.  Discussed with Dr. Sauceda in the ED at 4:34 PM. MRI may provide helpful additional evaluation, if clinically indicated.  --- End of Report ---  < end of copied text >         
  	57 y/o F     with PMH migraines,               Pt  c/o  right  occipital and rt temporal headache x 6 days, and noted she had trouble findings her words.        Pt reports the trouble findings words improved, but still present mildly.        Pt was seen earlier this morning  by Er  and CT ,showed  Lt temporal and occipital CVA and pt signed out AMA., to  take  care of  he r pets/ 9  cats/  1  dog       pt now returns  to er  for admission         *  s/p headaches/  word  finding  difficulty, all have  resolbed        Ct head, acute to  sub acute  infarct, in left temporal lobe/ left  occipital  lobe          Neuro   called by  er          on asa/  lipitor 80 mg    < from: MR Head No Cont (07.19.21 @ 11:53) >  Acute infarction within the left MCA territory as described. There is no associated acute intracranial hemorrhage. There is evidence of possible cortical laminar necrosis.    < end of copied text >    -< from: RIZWANA Echo Doppler (07.21.21 @ 14:09) >  . Trace mitral valve regurgitation.   2. Small patent foramen ovale, with predominantly right to left shunting across the atrial septum.        < end of copied text >  doppler lower ext to r/o dvt             ra< from: CT Head No Cont (07.17.21 @ 16:21) >  MPRESSION:  HEAD CT: Acute or subacute infarct suspected along mid to posterior left temporal lobe and possibly also upper left occipital lobe.  Discussed with Dr. Sauceda in the ED at 4:34 PM. MRI may provide helpful additional evaluation, if clinically indicated.  --- End of Report ---  < end of copied text >     Had extensive lengthy discussion iwth pt detail in length about pts current clinical status, management plan  all questions answered     [facility] health code [id number]    
  	57 y/o F     with PMH migraines,               Pt  c/o  right  occipital and rt temporal headache x 6 days, and noted she had trouble findings her words.        Pt reports the trouble findings words improved, but still present mildly.        Pt was seen earlier this morning  by Er  and CT ,showed  Lt temporal and occipital CVA and pt signed out AMA., to  take  care of  he r pets/ 9  cats/  1  dog       pt now returns  to er  for admission         *  s/p headaches/  word  finding  difficulty, all have  resolbed        Ct head, acute to  sub acute  infarct, in left temporal lobe/ left  occipital  lobe          Neuro   called by  er          on asa/  lipitor 80 mg    < from: MR Head No Cont (07.19.21 @ 11:53) >  Acute infarction within the left MCA territory as described. There is no associated acute intracranial hemorrhage. There is evidence of possible cortical laminar necrosis.    < end of copied text >    poss RIZWANA               *   bmi  is  37             on dvt ppx/ s/q  heparin         labs/ lipid  profile  in am     ra< from: CT Head No Cont (07.17.21 @ 16:21) >  MPRESSION:  HEAD CT: Acute or subacute infarct suspected along mid to posterior left temporal lobe and possibly also upper left occipital lobe.  Discussed with Dr. Sauceda in the ED at 4:34 PM. MRI may provide helpful additional evaluation, if clinically indicated.  --- End of Report ---  < end of copied text >     Had extensive lengthy discussion iwth pt detail in length about pts current clinical status, management plan  all questions answered     [facility] code [id number]    
Hyperthyroidism 
Hyperthyroidism 
subclinical Hyperthyroidism 
  	55 y/o F     with PMH migraines,               Pt  c/o  right  occipital and rt temporal headache x 6 days, and noted she had trouble findings her words.        Pt reports the trouble findings words improved, but still present mildly.        Pt was seen earlier this morning  by Er  and CT ,showed  Lt temporal and occipital CVA and pt signed out AMA., to  take  care of  he r pets/ 9  cats/  1  dog       pt now returns  to er  for admission         *  s/p headaches/  word  finding  difficulty, all have  resolbed        Ct head, acute to  sub acute  infarct, in left temporal lobe/ left  occipital  lobe          Neuro   called by  er          on asa/  lipitor 80 mg             Echo ordered           MRI brain ordered    *   bmi  is  37             on dvt ppx/ s/q  heparin         labs/ lipid  profile  in am     ra< from: CT Head No Cont (07.17.21 @ 16:21) >  MPRESSION:  HEAD CT: Acute or subacute infarct suspected along mid to posterior left temporal lobe and possibly also upper left occipital lobe.  Discussed with Dr. Sauceda in the ED at 4:34 PM. MRI may provide helpful additional evaluation, if clinically indicated.  --- End of Report ---  < end of copied text >         
subclinical Hyperthyroidism 
  	55 y/o F     with PMH migraines,               Pt  c/o  right  occipital and rt temporal headache x 6 days, and noted she had trouble findings her words.        Pt reports the trouble findings words improved, but still present mildly.        Pt was seen earlier this morning  by Er  and CT ,showed  Lt temporal and occipital CVA and pt signed out AMA., to  take  care of  he r pets/ 9  cats/  1  dog       pt now returns  to er  for admission         *  s/p headaches/  word  finding  difficulty, all have  resolbed        Ct head, acute to  sub acute  infarct, in left temporal lobe/ left  occipital  lobe          Neuro   called by  er          on asa/  lipitor 80 mg    < from: MR Head No Cont (07.19.21 @ 11:53) >  Acute infarction within the left MCA territory as described. There is no associated acute intracranial hemorrhage. There is evidence of possible cortical laminar necrosis.    < end of copied text >    poss RIZWANA               *   bmi  is  37             on dvt ppx/ s/q  heparin         labs/ lipid  profile  in am     ra< from: CT Head No Cont (07.17.21 @ 16:21) >  MPRESSION:  HEAD CT: Acute or subacute infarct suspected along mid to posterior left temporal lobe and possibly also upper left occipital lobe.  Discussed with Dr. Sauceda in the ED at 4:34 PM. MRI may provide helpful additional evaluation, if clinically indicated.  --- End of Report ---  < end of copied text >     Had extensive lengthy discussion iwth pt detail in length about pts current clinical status, management plan  all questions answered     [facility] code [id number]    
suppressed TSH

## 2021-07-23 NOTE — DISCHARGE NOTE NURSING/CASE MANAGEMENT/SOCIAL WORK - NSTRANSFERBELONGINGSRESP_GEN_A_NUR
"Chief Complaint   Patient presents with   • Medication Refill     levothyroxine        HISTORY OF PRESENT ILLNESS: Patient is a 55 y.o. female established patient who presents today to discuss hypothyroidism.    Hypothyroidism  Chronic in nature. Stable per patient. Patient states that her last TSH was \"recently\". Last recorded TSH was completed in 2016 repeat lab is ordered for patient at this time. Labwork due. Denies palpitations, skin changes, temperature intolerance, changes in bowel habits.      Morbid obesity with BMI of 50.0-59.9, adult (HCC)  Chronic in nature. Stable. Patient is counseled regarding healthy diet and exercise.    Vitamin D deficiency  Chronic in nature. Stable. Discussed with patient that she should talk to her new provider regarding this issue, continue supplementation at this time.      Patient Active Problem List    Diagnosis Date Noted   • Morbid obesity with BMI of 50.0-59.9, adult (HCC) 02/16/2018   • Vitamin D deficiency 08/16/2017   • Other and unspecified derangement of medial meniscus 12/29/2014   • Osteoarthritis of foot 07/10/2013   • Hypothyroidism 09/14/2012   • Low HDL (under 40) 09/14/2012       Allergies:Asa [aspirin]; Codeine; and Latex    Current Outpatient Prescriptions   Medication Sig Dispense Refill   • levothyroxine (SYNTHROID) 175 MCG Tab Take 1 Tab by mouth every day. 90 Tab 0   • Cholecalciferol (VITAMIN D3) 2000 UNIT CAPS Take 4,000 Units by mouth every day.     • cetirizine (ZYRTEC) 10 MG TABS Take 10 mg by mouth every day.     • Evening [place] OIL Take 1 Tab by mouth every morning.       No current facility-administered medications for this visit.        Social History   Substance Use Topics   • Smoking status: Never Smoker   • Smokeless tobacco: Never Used   • Alcohol use No       Family Status   Relation Status   • Mother Alive   • Father Alive     Family History   Problem Relation Age of Onset   • Cancer Mother      renal ca/ thyroid ca   • Heart Disease " "Father        Review of Systems:   Constitutional:  Negative for fever, chills, weight loss and malaise/fatigue.   HEENT:  Negative for ear pain, nosebleeds, congestion, sore throat and neck pain.    Eyes:  Negative for blurred vision.   Respiratory:  Negative for cough, sputum production, shortness of breath and wheezing.    Cardiovascular:  Negative for chest pain, palpitations, orthopnea and leg swelling.   Gastrointestinal:  Negative for heartburn, nausea, vomiting and abdominal pain.   Genitourinary:  Negative for dysuria, urgency and frequency.   Musculoskeletal:  Negative for myalgias, back pain and joint pain.   Skin:  Negative for rash and itching.   Neurological:  Negative for dizziness, tingling, tremors, sensory change, focal weakness and headaches.   Endo/Heme/Allergies:  Does not bruise/bleed easily.   Psychiatric/Behavioral:  Negative for depression, suicidal ideas and memory loss.  The patient is not nervous/anxious and does not have insomnia.    All other systems reviewed and are negative except as in HPI.    Exam:  Blood pressure 116/90, pulse 82, temperature 36.5 °C (97.7 °F), resp. rate 16, height 1.626 m (5' 4\"), weight (!) 133.5 kg (294 lb 5 oz), SpO2 96 %, not currently breastfeeding.  General:  Normal appearing. No distress.  HEENT:  Normocephalic. Eyes conjunctiva clear lids without ptosis, pupils equal and reactive to light accommodation, ears normal shape and contour, canals are clear bilaterally, tympanic membranes are benign, nasal mucosa benign, oropharynx is without erythema, edema or exudates.   Neck:  Supple without JVD or bruit. Thyroid is not enlarged.  Pulmonary:  Clear to ausculation.  Normal effort. No rales, ronchi, or wheezing.  Cardiovascular:  Regular rate and rhythm without murmur. Carotid and radial pulses are intact and equal bilaterally.  Abdomen:  Soft, nontender, nondistended. Normal bowel sounds. Liver and spleen are not palpable  Neurologic:  Grossly nonfocal  Lymph: "  No cervical, supraclavicular or axillary lymph nodes are palpable  Skin:  Warm and dry.  No obvious lesions.  Musculoskeletal:  Normal gait. No extremity cyanosis, clubbing, or edema.  Psych:  Normal mood and affect. Alert and oriented x3. Judgment and insight is normal.      PLAN:    1. Morbid obesity with BMI of 50.0-59.9, adult (CMS-HCC)  - Patient identified as having weight management issue.  Appropriate orders and counseling given.    2. Postoperative hypothyroidism  She will complete TSH and free T4, we'll discuss changing dosage based on patient results. Patient states that she has been stable on this dose of medication.   - TSH+FREE T4  - levothyroxine (SYNTHROID) 175 MCG Tab; Take 1 Tab by mouth every day.  Dispense: 90 Tab; Refill: 0    Scheduled follow-up with Dr. Self. Patient is encouraged to be seen in the emergency room for chest pain, palpitations, shortness of breath, dizziness, severe abdominal pain or other concerning symptoms.    Please note that this dictation was created using voice recognition software. I have made every reasonable attempt to correct obvious errors, but I expect that there are errors of grammar and possibly content that I did not discover before finalizing the note.    Assessment/Plan:  1. Morbid obesity with BMI of 50.0-59.9, adult (CMS-HCC)  Patient identified as having weight management issue.  Appropriate orders and counseling given.   2. Postoperative hypothyroidism  TSH+FREE T4    levothyroxine (SYNTHROID) 175 MCG Tab   3. Vitamin D deficiency            I have placed the below orders and discussed them with an approved delegating provider. The MA is performing the below orders under the direction of Dr. Cole.   yes

## 2021-07-23 NOTE — DISCHARGE NOTE NURSING/CASE MANAGEMENT/SOCIAL WORK - PATIENT PORTAL LINK FT
You can access the FollowMyHealth Patient Portal offered by [facility] by registering at the following website: [email or web address]. By joining [employer]’s FollowMyHealth portal, you will also be able to view your health information using other applications (apps) compatible with our system.

## 2021-07-23 NOTE — PROGRESS NOTE ADULT - PROVIDER SPECIALTY LIST ADULT
Endocrinology
Internal Medicine
Neurology
Internal Medicine
Endocrinology
Internal Medicine
Neurology
Endocrinology

## 2021-07-23 NOTE — DIETITIAN INITIAL EVALUATION ADULT. - OTHER INFO
Pt adm w/CVA. Pt s/p Swallow evaluation (7/22) w/recommendation for Regular diet, thin fluids. Met w/pt at bedside, pt reports good appetite & PO intake. Pt denies N/V/D/C or difficulty chewing/swallowing. PTA pt lived alone & was independent w/ADL's; doing her own food shopping/cooking. Pt reports eating healthy PTA but following a completely Carbohydrates-free diet. Pt was not consuming any sugars/breads/starches/fruits/juices/grains/potatoes/corn/starchy vegetables. Pt states she has been on tis diet since Feb 2021 for weight loss and has lost ~40# since then. Pt unable to state UBW prior to weight loss. Advised & encouraged pt to maintain a healthy diet w/consumption of healthy CHO. Discussed healthy CHO sources and suggested to re-introduce them in small portions slowly. Discussed importance of CHO in overall health and healthy ways to lose weight. Provided Stroke Nutrition Therapy and encouraged low sodium diet. Pt somewhat receptive to diet education, accepted handouts, and verbalized comprehension. Pt made aware RD remains available.

## 2021-07-23 NOTE — PROGRESS NOTE ADULT - NSICDXPILOT_GEN_ALL_CORE
[place]

## 2021-07-23 NOTE — PROGRESS NOTE ADULT - SUBJECTIVE AND OBJECTIVE BOX
Patient is a 56y old  Female who presents with a chief complaint of cva (23 Jul 2021 11:52)      Interval History: Endocrine wise stable   overall euthyroid     MEDICATIONS  (STANDING):  aspirin enteric coated 81 milliGRAM(s) Oral daily  atorvastatin 80 milliGRAM(s) Oral at bedtime  cefTRIAXone   IVPB 1000 milliGRAM(s) IV Intermittent every 24 hours  enoxaparin Injectable 110 milliGRAM(s) SubCutaneous two times a day  topiramate 25 milliGRAM(s) Oral daily    MEDICATIONS  (PRN):  acetaminophen   Tablet .. 650 milliGRAM(s) Oral every 6 hours PRN Temp greater or equal to 38C (100.4F), Mild Pain (1 - 3)  ketorolac   Injectable 15 milliGRAM(s) IV Push once PRN Moderate Pain (4 - 6)      Allergies    No Known Allergies    Intolerances        REVIEW OF SYSTEMS:  CONSTITUTIONAL: no changes  EYES: No eye pain, visual disturbances, or discharge  ENMT:  No difficulty hearing, No sinus or throat pain  NECK: No pain or stiffness  RESPIRATORY: No cough, wheezing, chills or hemoptysis; No shortness of breath  CARDIOVASCULAR: No chest pain, palpitations or leg swelling  GASTROINTESTINAL: No abdominal or epigastric pain. No nausea, vomiting, or hematemesis; No diarrhea or constipation. No melena or hematochezia.  GENITOURINARY: No dysuria, frequency, hematuria, or incontinence  NEUROLOGICAL: No headaches, memory loss, loss of strength, numbness, or tremors  SKIN: No itching, burning, rashes, or lesions   ENDOCRINE: No heat or cold intolerance; No hair loss  MUSCULOSKELETAL: No joint pain or swelling; No muscle, back, or extremity pain  PSYCHIATRIC: No depression, anxiety, mood swings, or difficulty sleeping  HEME/LYMPH: No easy bruising, or bleeding gums  ALLERY AND IMMUNOLOGIC: No hives or eczema    Vital Signs Last 24 Hrs  T(C): 36.7 (23 Jul 2021 10:44), Max: 36.9 (22 Jul 2021 23:53)  T(F): 98 (23 Jul 2021 10:44), Max: 98.5 (22 Jul 2021 23:53)  HR: 88 (23 Jul 2021 10:44) (80 - 90)  BP: 104/70 (23 Jul 2021 10:44) (104/70 - 123/77)  BP(mean): 82 (23 Jul 2021 10:44) (82 - 82)  RR: 18 (23 Jul 2021 10:44) (18 - 18)  SpO2: 95% (23 Jul 2021 10:44) (95% - 95%)    PHYSICAL EXAM:  GENERAL:   HEAD: Atraumatic, Normocephalic  EYES: PERRLA, conjunctiva and sclera clear  ENMT: No tonsillar erythema, exudates, or enlargement; Moist mucous membranes, Good dentition, No lesions  NECK: Supple, No JVD, Normal thyroid  NERVOUS SYSTEM:  Alert & Oriented X3, Good concentration; Motor Strength 5/5 B/L upper and lower extremities  CHEST/LUNG: Clear to auscultaion bilaterally; No rales, rhonchi, wheezing, or rubs  HEART: Regular rate and rhythm; No murmurs, rubs, or gallops  ABDOMEN: Soft, Nontender, Nondistended; Bowel sounds present  EXTREMITIES:  2+ Peripheral Pulses, no edema  SKIN: No rashes or lesions    LABS:    PT/INR - ( 23 Jul 2021 06:45 )   PT: 12.6 sec;   INR: 1.09 ratio         PTT - ( 23 Jul 2021 06:45 )  PTT:36.1 sec    CAPILLARY BLOOD GLUCOSE        Lipid panel:           Thyroid:  Diabetes Tests:  Parathyroid Panel:  Adrenals:  RADIOLOGY & ADDITIONAL TESTS:    Imaging Personally Reviewed:  [ ] YES  [ ] NO    Consultant(s) Notes Reviewed:  [ ] YES  [ ] NO    Care Discussed with Consultants/Other Providers [ ] YES  [ ] NO

## 2021-07-23 NOTE — DIETITIAN INITIAL EVALUATION ADULT. - PERTINENT MEDS FT
MEDICATIONS  (STANDING):  aspirin enteric coated 81 milliGRAM(s) Oral daily  atorvastatin 80 milliGRAM(s) Oral at bedtime  cefTRIAXone   IVPB 1000 milliGRAM(s) IV Intermittent every 24 hours  enoxaparin Injectable 110 milliGRAM(s) SubCutaneous two times a day  topiramate 25 milliGRAM(s) Oral daily    MEDICATIONS  (PRN):  acetaminophen   Tablet .. 650 milliGRAM(s) Oral every 6 hours PRN Temp greater or equal to 38C (100.4F), Mild Pain (1 - 3)  ketorolac   Injectable 15 milliGRAM(s) IV Push once PRN Moderate Pain (4 - 6)

## 2021-07-23 NOTE — PROGRESS NOTE ADULT - PROBLEM SELECTOR PLAN 1
no treatment   subclinical Hyperthyroidism   repeat thyroid function tests as out-patient   and treat accordingly
no treatment for now   Check thyroid function tests in a few weeks
stable   no treatment required   can be worked up and managed as out-patient
clinically euthyroid   no treatment for now   repeat thyroid function tests in a few weeks as out-patient
apart from suppressed TSH other thyroid function tests are normal   patient has subclinical Hyperthyroidism   no treatment for now

## 2021-07-23 NOTE — DIETITIAN INITIAL EVALUATION ADULT. - PERTINENT LABORATORY DATA
07-23 Na142 mmol/L Glu 98 mg/dL K+ 4.1 mmol/L Cr  0.97 mg/dL BUN 20 mg/dL 07-22 Phos 4.3 mg/dL 07-23 Alb 3.4 g/dL 07-19 Chol 271 mg/dL<H> LDL --    HDL 50 mg/dL<L> Trig 174 mg/dL<H>    07-19-21 @ 14:31A1C 5.6

## 2021-07-26 LAB
DNA PLOIDY SPEC FC-IMP: SIGNIFICANT CHANGE UP
PTR INTERPRETATION: SIGNIFICANT CHANGE UP

## 2021-07-27 LAB
CULTURE RESULTS: SIGNIFICANT CHANGE UP
CULTURE RESULTS: SIGNIFICANT CHANGE UP
SPECIMEN SOURCE: SIGNIFICANT CHANGE UP
SPECIMEN SOURCE: SIGNIFICANT CHANGE UP

## 2021-07-29 DIAGNOSIS — E05.80 OTHER THYROTOXICOSIS WITHOUT THYROTOXIC CRISIS OR STORM: ICD-10-CM

## 2021-07-29 DIAGNOSIS — G43.909 MIGRAINE, UNSPECIFIED, NOT INTRACTABLE, WITHOUT STATUS MIGRAINOSUS: ICD-10-CM

## 2021-07-29 DIAGNOSIS — Q21.1 ATRIAL SEPTAL DEFECT: ICD-10-CM

## 2021-07-29 DIAGNOSIS — I63.9 CEREBRAL INFARCTION, UNSPECIFIED: ICD-10-CM

## 2021-07-29 DIAGNOSIS — E66.9 OBESITY, UNSPECIFIED: ICD-10-CM

## 2021-07-29 DIAGNOSIS — J98.11 ATELECTASIS: ICD-10-CM

## 2021-07-29 DIAGNOSIS — I63.412 CEREBRAL INFARCTION DUE TO EMBOLISM OF LEFT MIDDLE CEREBRAL ARTERY: ICD-10-CM

## 2021-08-02 ENCOUNTER — APPOINTMENT (OUTPATIENT)
Dept: CARDIOLOGY | Facility: CLINIC | Age: 56
End: 2021-08-02
Payer: COMMERCIAL

## 2021-08-02 ENCOUNTER — NON-APPOINTMENT (OUTPATIENT)
Age: 56
End: 2021-08-02

## 2021-08-02 VITALS
HEIGHT: 70 IN | TEMPERATURE: 98.4 F | DIASTOLIC BLOOD PRESSURE: 100 MMHG | BODY MASS INDEX: 37.65 KG/M2 | OXYGEN SATURATION: 95 % | WEIGHT: 263 LBS | HEART RATE: 82 BPM | SYSTOLIC BLOOD PRESSURE: 140 MMHG

## 2021-08-02 PROCEDURE — 99495 TRANSJ CARE MGMT MOD F2F 14D: CPT

## 2021-08-02 PROCEDURE — 93000 ELECTROCARDIOGRAM COMPLETE: CPT

## 2021-08-02 NOTE — DISCUSSION/SUMMARY
[___ Month(s)] : in [unfilled] month(s) [FreeTextEntry1] : Patient may well benefit from PFO closure; patient referred to interventional cardiology for consideration of PFO closure.  Meanwhile patient told to continue dual antiplatelet therapy.  Also would continue with aggressive lipid management.  Suggest follow-up labs in 2 to 3 months for reevaluation.  Blood pressures appear mildly elevated although patient states that generally her blood pressures are better controlled.  She has been trying to more aggressively diet and lose weight.  Advised increase activity as tolerated.  Patient scheduled to follow-up with neurology as well next week.  May benefit from speech therapy.

## 2021-08-02 NOTE — CARDIOLOGY SUMMARY
[de-identified] : 8/2/2021,Sinus  Rhythm \par -Anteroseptal infarct -age undetermined.  [de-identified] : RIZWANA, normal left ventricular function, trace mitral insufficiency, small patent foramen ovale with evidence of bubbles traversing from right to left (greater than 5 bubbles).

## 2021-08-02 NOTE — REASON FOR VISIT
[FreeTextEntry1] : Hospital follow-up for patient.  Discharged from hospital on 7/23/2021.\par Admitted to the hospital with complaints of headaches x6 days and some aphasia.  CT demonstrated acute left temporal and occipital CVA.  RIZWANA showed evidence of a PFO which may have been the cause of her stroke.  Patient was discharged on dual antiplatelet therapy and is here for routine follow-up.\par \par She has no other new neurological complaints.  She is somewhat concerned about getting her PFO sealed.  Reportedly no history of DVT was noted.

## 2021-08-05 ENCOUNTER — APPOINTMENT (OUTPATIENT)
Dept: NEUROLOGY | Facility: CLINIC | Age: 56
End: 2021-08-05
Payer: COMMERCIAL

## 2021-08-05 PROCEDURE — 99203 OFFICE O/P NEW LOW 30 MIN: CPT | Mod: 95

## 2021-08-05 PROCEDURE — 99213 OFFICE O/P EST LOW 20 MIN: CPT | Mod: 95

## 2021-08-05 RX ORDER — ASPIRIN 81 MG/1
81 TABLET, FILM COATED ORAL DAILY
Qty: 90 | Refills: 3 | Status: ACTIVE | COMMUNITY
Start: 2021-08-05

## 2021-08-10 ENCOUNTER — NON-APPOINTMENT (OUTPATIENT)
Age: 56
End: 2021-08-10

## 2021-08-12 ENCOUNTER — APPOINTMENT (OUTPATIENT)
Dept: CARDIOLOGY | Facility: CLINIC | Age: 56
End: 2021-08-12
Payer: COMMERCIAL

## 2021-08-12 PROCEDURE — 93246 EXT ECG>7D<15D RECORDING: CPT

## 2021-08-26 ENCOUNTER — APPOINTMENT (OUTPATIENT)
Dept: CARDIOLOGY | Facility: CLINIC | Age: 56
End: 2021-08-26
Payer: COMMERCIAL

## 2021-08-26 VITALS
WEIGHT: 263 LBS | SYSTOLIC BLOOD PRESSURE: 112 MMHG | HEART RATE: 110 BPM | DIASTOLIC BLOOD PRESSURE: 82 MMHG | BODY MASS INDEX: 37.65 KG/M2 | OXYGEN SATURATION: 97 % | HEIGHT: 70 IN

## 2021-08-26 PROCEDURE — 93000 ELECTROCARDIOGRAM COMPLETE: CPT

## 2021-08-26 PROCEDURE — 99215 OFFICE O/P EST HI 40 MIN: CPT

## 2021-08-28 NOTE — HISTORY OF PRESENT ILLNESS
[FreeTextEntry1] : 56 year old woman in good health.  In July she presented with a l MCA stroke with right sided weakness and aphasia. She was found to have a L MCA stroke.  subsequent w/u did not show vascular disease and suggested a cardioembolic source.  She recently completed monitoring for occult AF and none was found.  A PFO was present o TTE and RIZWANA and she presents for possible closure.

## 2021-08-28 NOTE — DISCUSSION/SUMMARY
[FreeTextEntry1] : RIZWANA was reviewed.  This shows a moderate sized PFO with atrial septal aneurysm.  Right to left shunting was present.  The evidence for PFO closure in cryptogenic stroke was discussed with the patient as well as the procedure itself.  She had questions but seemed in favor of proceeding.

## 2021-08-28 NOTE — PHYSICAL EXAM
[Well Developed] : well developed [Well Nourished] : well nourished [No Acute Distress] : no acute distress [Normal Conjunctiva] : normal conjunctiva [Normal Venous Pressure] : normal venous pressure [No Carotid Bruit] : no carotid bruit [Normal S1, S2] : normal S1, S2 [No Rub] : no rub [No Murmur] : no murmur [No Gallop] : no gallop [Clear Lung Fields] : clear lung fields [Good Air Entry] : good air entry [No Respiratory Distress] : no respiratory distress  [Soft] : abdomen soft [Non Tender] : non-tender [No Masses/organomegaly] : no masses/organomegaly [Normal Bowel Sounds] : normal bowel sounds [Normal Gait] : normal gait [No Edema] : no edema [No Cyanosis] : no cyanosis [No Varicosities] : no varicosities [No Clubbing] : no clubbing [No Rash] : no rash [No Skin Lesions] : no skin lesions [Moves all extremities] : moves all extremities [No Focal Deficits] : no focal deficits [Normal Speech] : normal speech [Alert and Oriented] : alert and oriented [Normal memory] : normal memory

## 2021-09-14 ENCOUNTER — APPOINTMENT (OUTPATIENT)
Dept: NEUROLOGY | Facility: CLINIC | Age: 56
End: 2021-09-14
Payer: COMMERCIAL

## 2021-09-14 PROCEDURE — 93886 INTRACRANIAL COMPLETE STUDY: CPT

## 2021-09-14 PROCEDURE — 93880 EXTRACRANIAL BILAT STUDY: CPT

## 2021-09-14 PROCEDURE — 93892 TCD EMBOLI DETECT W/O INJ: CPT

## 2021-09-22 RX ORDER — CLOPIDOGREL BISULFATE 75 MG/1
75 TABLET, FILM COATED ORAL DAILY
Refills: 0 | Status: DISCONTINUED | COMMUNITY
Start: 2021-08-05 | End: 2021-09-22

## 2021-09-28 ENCOUNTER — APPOINTMENT (OUTPATIENT)
Dept: NEUROLOGY | Facility: CLINIC | Age: 56
End: 2021-09-28

## 2021-09-29 ENCOUNTER — APPOINTMENT (OUTPATIENT)
Dept: NEUROLOGY | Facility: CLINIC | Age: 56
End: 2021-09-29
Payer: COMMERCIAL

## 2021-09-29 PROCEDURE — 99213 OFFICE O/P EST LOW 20 MIN: CPT | Mod: 95

## 2021-09-30 ENCOUNTER — APPOINTMENT (OUTPATIENT)
Dept: NEUROLOGY | Facility: CLINIC | Age: 56
End: 2021-09-30

## 2021-10-03 DIAGNOSIS — Z01.818 ENCOUNTER FOR OTHER PREPROCEDURAL EXAMINATION: ICD-10-CM

## 2021-10-04 ENCOUNTER — APPOINTMENT (OUTPATIENT)
Dept: CARDIOLOGY | Facility: CLINIC | Age: 56
End: 2021-10-04

## 2021-10-04 ENCOUNTER — APPOINTMENT (OUTPATIENT)
Dept: DISASTER EMERGENCY | Facility: CLINIC | Age: 56
End: 2021-10-04

## 2021-10-05 LAB — SARS-COV-2 N GENE NPH QL NAA+PROBE: NOT DETECTED

## 2021-10-07 ENCOUNTER — APPOINTMENT (OUTPATIENT)
Dept: CV DIAGNOSITCS | Facility: HOSPITAL | Age: 56
End: 2021-10-07

## 2021-10-07 ENCOUNTER — INPATIENT (INPATIENT)
Facility: HOSPITAL | Age: 56
LOS: 0 days | Discharge: HOME | DRG: 274 | End: 2021-10-07
Attending: INTERNAL MEDICINE | Admitting: INTERNAL MEDICINE
Payer: COMMERCIAL

## 2021-10-07 VITALS
OXYGEN SATURATION: 97 % | TEMPERATURE: 98 F | DIASTOLIC BLOOD PRESSURE: 62 MMHG | HEART RATE: 66 BPM | HEIGHT: 70 IN | SYSTOLIC BLOOD PRESSURE: 142 MMHG | RESPIRATION RATE: 16 BRPM | WEIGHT: 265 LBS

## 2021-10-07 VITALS
OXYGEN SATURATION: 98 % | HEART RATE: 88 BPM | DIASTOLIC BLOOD PRESSURE: 88 MMHG | RESPIRATION RATE: 16 BRPM | SYSTOLIC BLOOD PRESSURE: 138 MMHG

## 2021-10-07 DIAGNOSIS — Z98.890 OTHER SPECIFIED POSTPROCEDURAL STATES: Chronic | ICD-10-CM

## 2021-10-07 DIAGNOSIS — Q21.9 CONGENITAL MALFORMATION OF CARDIAC SEPTUM, UNSPECIFIED: ICD-10-CM

## 2021-10-07 LAB
ALBUMIN SERPL ELPH-MCNC: 4.7 G/DL — SIGNIFICANT CHANGE UP (ref 3.3–5)
ALP SERPL-CCNC: 129 U/L — HIGH (ref 40–120)
ALT FLD-CCNC: 31 U/L — SIGNIFICANT CHANGE UP (ref 10–45)
ANION GAP SERPL CALC-SCNC: 15 MMOL/L — SIGNIFICANT CHANGE UP (ref 5–17)
AST SERPL-CCNC: 20 U/L — SIGNIFICANT CHANGE UP (ref 10–40)
BILIRUB SERPL-MCNC: 0.4 MG/DL — SIGNIFICANT CHANGE UP (ref 0.2–1.2)
BUN SERPL-MCNC: 17 MG/DL — SIGNIFICANT CHANGE UP (ref 7–23)
CALCIUM SERPL-MCNC: 10.1 MG/DL — SIGNIFICANT CHANGE UP (ref 8.4–10.5)
CHLORIDE SERPL-SCNC: 105 MMOL/L — SIGNIFICANT CHANGE UP (ref 96–108)
CO2 SERPL-SCNC: 20 MMOL/L — LOW (ref 22–31)
CREAT SERPL-MCNC: 0.89 MG/DL — SIGNIFICANT CHANGE UP (ref 0.5–1.3)
GLUCOSE SERPL-MCNC: 105 MG/DL — HIGH (ref 70–99)
HCT VFR BLD CALC: 37.6 % — SIGNIFICANT CHANGE UP (ref 34.5–45)
HGB BLD-MCNC: 11.9 G/DL — SIGNIFICANT CHANGE UP (ref 11.5–15.5)
MCHC RBC-ENTMCNC: 27.9 PG — SIGNIFICANT CHANGE UP (ref 27–34)
MCHC RBC-ENTMCNC: 31.6 GM/DL — LOW (ref 32–36)
MCV RBC AUTO: 88.1 FL — SIGNIFICANT CHANGE UP (ref 80–100)
NRBC # BLD: 0 /100 WBCS — SIGNIFICANT CHANGE UP (ref 0–0)
PLATELET # BLD AUTO: 271 K/UL — SIGNIFICANT CHANGE UP (ref 150–400)
POTASSIUM SERPL-MCNC: 4.1 MMOL/L — SIGNIFICANT CHANGE UP (ref 3.5–5.3)
POTASSIUM SERPL-SCNC: 4.1 MMOL/L — SIGNIFICANT CHANGE UP (ref 3.5–5.3)
PROT SERPL-MCNC: 8.2 G/DL — SIGNIFICANT CHANGE UP (ref 6–8.3)
RBC # BLD: 4.27 M/UL — SIGNIFICANT CHANGE UP (ref 3.8–5.2)
RBC # FLD: 13.6 % — SIGNIFICANT CHANGE UP (ref 10.3–14.5)
SODIUM SERPL-SCNC: 140 MMOL/L — SIGNIFICANT CHANGE UP (ref 135–145)
WBC # BLD: 5.61 K/UL — SIGNIFICANT CHANGE UP (ref 3.8–10.5)
WBC # FLD AUTO: 5.61 K/UL — SIGNIFICANT CHANGE UP (ref 3.8–10.5)

## 2021-10-07 PROCEDURE — 93306 TTE W/DOPPLER COMPLETE: CPT | Mod: 26

## 2021-10-07 PROCEDURE — 99153 MOD SED SAME PHYS/QHP EA: CPT

## 2021-10-07 PROCEDURE — 93010 ELECTROCARDIOGRAM REPORT: CPT | Mod: 77

## 2021-10-07 PROCEDURE — C1769: CPT

## 2021-10-07 PROCEDURE — 93005 ELECTROCARDIOGRAM TRACING: CPT

## 2021-10-07 PROCEDURE — 93010 ELECTROCARDIOGRAM REPORT: CPT

## 2021-10-07 PROCEDURE — C1817: CPT

## 2021-10-07 PROCEDURE — C1894: CPT

## 2021-10-07 PROCEDURE — 99152 MOD SED SAME PHYS/QHP 5/>YRS: CPT

## 2021-10-07 PROCEDURE — 93662 INTRACARDIAC ECG (ICE): CPT | Mod: 26

## 2021-10-07 PROCEDURE — 85027 COMPLETE CBC AUTOMATED: CPT

## 2021-10-07 PROCEDURE — C1759: CPT

## 2021-10-07 PROCEDURE — 93580 TRANSCATH CLOSURE OF ASD: CPT

## 2021-10-07 PROCEDURE — 93662 INTRACARDIAC ECG (ICE): CPT

## 2021-10-07 PROCEDURE — C8929: CPT

## 2021-10-07 PROCEDURE — C1887: CPT

## 2021-10-07 PROCEDURE — 80053 COMPREHEN METABOLIC PANEL: CPT

## 2021-10-07 RX ORDER — CEPHALEXIN 500 MG
1 CAPSULE ORAL
Qty: 1 | Refills: 0
Start: 2021-10-07 | End: 2021-10-07

## 2021-10-07 RX ORDER — CEFAZOLIN SODIUM 1 G
1000 VIAL (EA) INJECTION EVERY 8 HOURS
Refills: 0 | Status: DISCONTINUED | OUTPATIENT
Start: 2021-10-07 | End: 2021-10-07

## 2021-10-07 RX ORDER — FAMOTIDINE 10 MG/ML
40 INJECTION INTRAVENOUS AT BEDTIME
Refills: 0 | Status: DISCONTINUED | OUTPATIENT
Start: 2021-10-07 | End: 2021-10-07

## 2021-10-07 RX ORDER — ASPIRIN/CALCIUM CARB/MAGNESIUM 324 MG
81 TABLET ORAL DAILY
Refills: 0 | Status: DISCONTINUED | OUTPATIENT
Start: 2021-10-07 | End: 2021-10-07

## 2021-10-07 RX ORDER — SODIUM CHLORIDE 9 MG/ML
3 INJECTION INTRAMUSCULAR; INTRAVENOUS; SUBCUTANEOUS EVERY 8 HOURS
Refills: 0 | Status: DISCONTINUED | OUTPATIENT
Start: 2021-10-07 | End: 2021-10-07

## 2021-10-07 RX ORDER — ATORVASTATIN CALCIUM 80 MG/1
80 TABLET, FILM COATED ORAL AT BEDTIME
Refills: 0 | Status: DISCONTINUED | OUTPATIENT
Start: 2021-10-07 | End: 2021-10-07

## 2021-10-07 RX ORDER — LOSARTAN POTASSIUM 100 MG/1
50 TABLET, FILM COATED ORAL DAILY
Refills: 0 | Status: DISCONTINUED | OUTPATIENT
Start: 2021-10-07 | End: 2021-10-07

## 2021-10-07 RX ORDER — CLOPIDOGREL BISULFATE 75 MG/1
1 TABLET, FILM COATED ORAL
Qty: 90 | Refills: 1
Start: 2021-10-07 | End: 2022-04-04

## 2021-10-07 RX ORDER — CEPHALEXIN 500 MG
1 CAPSULE ORAL
Qty: 0 | Refills: 0 | DISCHARGE

## 2021-10-07 RX ORDER — ASPIRIN/CALCIUM CARB/MAGNESIUM 324 MG
1 TABLET ORAL
Qty: 0 | Refills: 0 | DISCHARGE
Start: 2021-10-07

## 2021-10-07 RX ORDER — CLOPIDOGREL BISULFATE 75 MG/1
75 TABLET, FILM COATED ORAL DAILY
Refills: 0 | Status: DISCONTINUED | OUTPATIENT
Start: 2021-10-08 | End: 2021-10-07

## 2021-10-07 RX ADMIN — Medication 100 MILLIGRAM(S): at 17:23

## 2021-10-07 RX ADMIN — SODIUM CHLORIDE 3 MILLILITER(S): 9 INJECTION INTRAMUSCULAR; INTRAVENOUS; SUBCUTANEOUS at 14:25

## 2021-10-07 NOTE — ASU DISCHARGE PLAN (ADULT/PEDIATRIC) - CARE PROVIDER_API CALL
Chidi Wolff)  Cardiology; Internal Medicine; Interventional Cardiology  [address] [place] [address]  Phone: [phone]  Fax: [phone]  Established Patient  Follow Up Time:

## 2021-10-07 NOTE — H&P CARDIOLOGY - NSICDXPASTMEDICALHX_GEN_ALL_CORE_FT
PAST MEDICAL HISTORY:  Migraines      PAST MEDICAL HISTORY:  Cryptogenic stroke     HLD (hyperlipidemia)     Migraines     Obesity (BMI 30-39.9)

## 2021-10-07 NOTE — ASU DISCHARGE PLAN (ADULT/PEDIATRIC) - WILL THE PATIENT ACCEPT THE PFIZER COVID-19 VACCINE IF ELIGIBLE AND IT IS AVAILABLE?
19-Apr-2019 22:45
20-Apr-2019 11:29
23-Apr-2019 16:14
23-Apr-2019 16:42
29-Apr-2019 11:16
30-Apr-2019 10:41
Not applicable

## 2021-10-07 NOTE — CHART NOTE - NSCHARTNOTEFT_GEN_A_CORE
Removal of Femoral Sheath    Pulses in the (right lower extremity are (palpable). The patient was placed in the supine position. The insertion site was identified and the sutures were removed per protocol.  The _8Fr and  9Fr___ Q[place] femoral venous sheath x 2 were then removed. Direct pressure was applied for  ____20__ minutes.     Monitoring of the (right) groin and both lower extremities including neuro-vascular checks and vital signs every 15 minutes x 4, then every 30 minutes x 2, then every 1 hour x 2 and the every 4 hours was ordered.    Complications: No hematoma, no bleed    Comments:  Pt tolerated well.     Dragan Lewis, NP  x [phone]

## 2021-10-07 NOTE — H&P CARDIOLOGY - NSICDXPASTSURGICALHX_GEN_ALL_CORE_FT
PAST SURGICAL HISTORY:  No significant past surgical history      PAST SURGICAL HISTORY:  H/O: knee surgery

## 2021-10-07 NOTE — H&P CARDIOLOGY - HISTORY OF PRESENT ILLNESS
55 y/o F     with PMH migraines,               Pt  c/o  right  occipital and rt temporal headache x 6 days, and noted she had trouble findings her words.        Pt reports the trouble findings words improved        Pt was seen earlier this morning and CT ,  Lt temporal and occipital CVA and signed out AMA., a s she  had  to take  care  of  her  petrs        No fever/chills, No photophobia/eye pain/changes in vision,       No chest pain/palpitations, no SOB/cough, no wheeze/stridor, No abdominal pain, No N/V/D 56 year old female with PMHx of HLD, obesity (BMI>38), migraine, s/p L MCA stroke in 7/21 found to have PFO, now presents for PFO closure. Pt reports she was driving to work, not able to find words in 7/2021 went to [facility] ER, found to have a L MCA stroke, and PFO on RIZWANA.   Pt had f/u with Dr. Henriquez then referred to Dr. Wolff for further evaluation.   Pt denies weakness of limbs, fever/chills, photophobia/eye pain/changes in vision, no chest pain/palpitations, no SOB/cough.       Card: Pramod Henriquez    < from: TTE Echo Complete w/o Contrast w/ Doppler (07.19.21 @ 14:18) >   1. Left ventricular ejection fraction, by visual estimation, is 60 to 65%.   2. There is mild concentric left ventricular hypertrophy.   3. Trace mitral valve regurgitation.   4. Mild tricuspid regurgitation.   5. No evidence of any thrombus.    < end of copied text >  < from: RIZWANA Echo Doppler (07.21.21 @ 14:09) >  1. Trace mitral valve regurgitation.   2. Small patent foramen ovale, with predominantly right to left shunting across the atrial septum.    < end of copied text >

## 2021-10-13 ENCOUNTER — EMERGENCY (EMERGENCY)
Facility: HOSPITAL | Age: 56
LOS: 1 days | Discharge: HOME | End: 2021-10-13
Attending: EMERGENCY MEDICINE
Payer: COMMERCIAL

## 2021-10-13 VITALS
TEMPERATURE: 98 F | WEIGHT: 265 LBS | DIASTOLIC BLOOD PRESSURE: 103 MMHG | SYSTOLIC BLOOD PRESSURE: 159 MMHG | HEIGHT: 70 IN | RESPIRATION RATE: 18 BRPM | OXYGEN SATURATION: 99 % | HEART RATE: 112 BPM

## 2021-10-13 DIAGNOSIS — Z98.890 OTHER SPECIFIED POSTPROCEDURAL STATES: Chronic | ICD-10-CM

## 2021-10-13 PROBLEM — E78.5 HYPERLIPIDEMIA, UNSPECIFIED: Chronic | Status: ACTIVE | Noted: 2021-10-07

## 2021-10-13 PROBLEM — I63.9 CEREBRAL INFARCTION, UNSPECIFIED: Chronic | Status: ACTIVE | Noted: 2021-10-07

## 2021-10-13 PROBLEM — E66.9 OBESITY, UNSPECIFIED: Chronic | Status: ACTIVE | Noted: 2021-10-07

## 2021-10-13 LAB
ALBUMIN SERPL ELPH-MCNC: 4.5 G/DL — SIGNIFICANT CHANGE UP (ref 3.3–5)
ALP SERPL-CCNC: 253 U/L — HIGH (ref 40–120)
ALT FLD-CCNC: 97 U/L — HIGH (ref 10–45)
ANION GAP SERPL CALC-SCNC: 17 MMOL/L — SIGNIFICANT CHANGE UP (ref 5–17)
AST SERPL-CCNC: 80 U/L — HIGH (ref 10–40)
BASOPHILS # BLD AUTO: 0.02 K/UL — SIGNIFICANT CHANGE UP (ref 0–0.2)
BASOPHILS NFR BLD AUTO: 0.3 % — SIGNIFICANT CHANGE UP (ref 0–2)
BILIRUB SERPL-MCNC: 0.4 MG/DL — SIGNIFICANT CHANGE UP (ref 0.2–1.2)
BUN SERPL-MCNC: 18 MG/DL — SIGNIFICANT CHANGE UP (ref 7–23)
CALCIUM SERPL-MCNC: 9.8 MG/DL — SIGNIFICANT CHANGE UP (ref 8.4–10.5)
CHLORIDE SERPL-SCNC: 100 MMOL/L — SIGNIFICANT CHANGE UP (ref 96–108)
CO2 SERPL-SCNC: 18 MMOL/L — LOW (ref 22–31)
CREAT SERPL-MCNC: 1 MG/DL — SIGNIFICANT CHANGE UP (ref 0.5–1.3)
D DIMER BLD IA.RAPID-MCNC: 311 NG/ML DDU — HIGH
EOSINOPHIL # BLD AUTO: 0.06 K/UL — SIGNIFICANT CHANGE UP (ref 0–0.5)
EOSINOPHIL NFR BLD AUTO: 0.9 % — SIGNIFICANT CHANGE UP (ref 0–6)
GAS PNL BLDV: SIGNIFICANT CHANGE UP
GLUCOSE SERPL-MCNC: 101 MG/DL — HIGH (ref 70–99)
HCT VFR BLD CALC: 39.3 % — SIGNIFICANT CHANGE UP (ref 34.5–45)
HGB BLD-MCNC: 12.3 G/DL — SIGNIFICANT CHANGE UP (ref 11.5–15.5)
IMM GRANULOCYTES NFR BLD AUTO: 0.3 % — SIGNIFICANT CHANGE UP (ref 0–1.5)
LYMPHOCYTES # BLD AUTO: 1.18 K/UL — SIGNIFICANT CHANGE UP (ref 1–3.3)
LYMPHOCYTES # BLD AUTO: 17.3 % — SIGNIFICANT CHANGE UP (ref 13–44)
MAGNESIUM SERPL-MCNC: 2.2 MG/DL — SIGNIFICANT CHANGE UP (ref 1.6–2.6)
MCHC RBC-ENTMCNC: 27.5 PG — SIGNIFICANT CHANGE UP (ref 27–34)
MCHC RBC-ENTMCNC: 31.3 GM/DL — LOW (ref 32–36)
MCV RBC AUTO: 87.9 FL — SIGNIFICANT CHANGE UP (ref 80–100)
MONOCYTES # BLD AUTO: 0.39 K/UL — SIGNIFICANT CHANGE UP (ref 0–0.9)
MONOCYTES NFR BLD AUTO: 5.7 % — SIGNIFICANT CHANGE UP (ref 2–14)
NEUTROPHILS # BLD AUTO: 5.17 K/UL — SIGNIFICANT CHANGE UP (ref 1.8–7.4)
NEUTROPHILS NFR BLD AUTO: 75.5 % — SIGNIFICANT CHANGE UP (ref 43–77)
NRBC # BLD: 0 /100 WBCS — SIGNIFICANT CHANGE UP (ref 0–0)
PHOSPHATE SERPL-MCNC: 3.4 MG/DL — SIGNIFICANT CHANGE UP (ref 2.5–4.5)
PLATELET # BLD AUTO: 276 K/UL — SIGNIFICANT CHANGE UP (ref 150–400)
POTASSIUM SERPL-MCNC: 4.7 MMOL/L — SIGNIFICANT CHANGE UP (ref 3.5–5.3)
POTASSIUM SERPL-SCNC: 4.7 MMOL/L — SIGNIFICANT CHANGE UP (ref 3.5–5.3)
PROT SERPL-MCNC: 8.5 G/DL — HIGH (ref 6–8.3)
RBC # BLD: 4.47 M/UL — SIGNIFICANT CHANGE UP (ref 3.8–5.2)
RBC # FLD: 13.5 % — SIGNIFICANT CHANGE UP (ref 10.3–14.5)
SODIUM SERPL-SCNC: 135 MMOL/L — SIGNIFICANT CHANGE UP (ref 135–145)
TROPONIN T, HIGH SENSITIVITY RESULT: <6 NG/L — SIGNIFICANT CHANGE UP (ref 0–51)
WBC # BLD: 6.84 K/UL — SIGNIFICANT CHANGE UP (ref 3.8–10.5)
WBC # FLD AUTO: 6.84 K/UL — SIGNIFICANT CHANGE UP (ref 3.8–10.5)

## 2021-10-13 PROCEDURE — 0225U NFCT DS DNA&RNA 21 SARSCOV2: CPT

## 2021-10-13 PROCEDURE — 80053 COMPREHEN METABOLIC PANEL: CPT

## 2021-10-13 PROCEDURE — 83690 ASSAY OF LIPASE: CPT

## 2021-10-13 PROCEDURE — 99285 EMERGENCY DEPT VISIT HI MDM: CPT | Mod: 25

## 2021-10-13 PROCEDURE — 82330 ASSAY OF CALCIUM: CPT

## 2021-10-13 PROCEDURE — 71275 CT ANGIOGRAPHY CHEST: CPT | Mod: MA

## 2021-10-13 PROCEDURE — 71275 CT ANGIOGRAPHY CHEST: CPT | Mod: 26,MA

## 2021-10-13 PROCEDURE — 76705 ECHO EXAM OF ABDOMEN: CPT

## 2021-10-13 PROCEDURE — 82947 ASSAY GLUCOSE BLOOD QUANT: CPT

## 2021-10-13 PROCEDURE — 84100 ASSAY OF PHOSPHORUS: CPT

## 2021-10-13 PROCEDURE — 85018 HEMOGLOBIN: CPT

## 2021-10-13 PROCEDURE — 83735 ASSAY OF MAGNESIUM: CPT

## 2021-10-13 PROCEDURE — 83605 ASSAY OF LACTIC ACID: CPT

## 2021-10-13 PROCEDURE — 82435 ASSAY OF BLOOD CHLORIDE: CPT

## 2021-10-13 PROCEDURE — 71045 X-RAY EXAM CHEST 1 VIEW: CPT | Mod: 26

## 2021-10-13 PROCEDURE — 84132 ASSAY OF SERUM POTASSIUM: CPT

## 2021-10-13 PROCEDURE — 85014 HEMATOCRIT: CPT

## 2021-10-13 PROCEDURE — 85025 COMPLETE CBC W/AUTO DIFF WBC: CPT

## 2021-10-13 PROCEDURE — 84484 ASSAY OF TROPONIN QUANT: CPT

## 2021-10-13 PROCEDURE — 74177 CT ABD & PELVIS W/CONTRAST: CPT | Mod: 26,MA

## 2021-10-13 PROCEDURE — 85379 FIBRIN DEGRADATION QUANT: CPT

## 2021-10-13 PROCEDURE — 99284 EMERGENCY DEPT VISIT MOD MDM: CPT | Mod: 25

## 2021-10-13 PROCEDURE — 93005 ELECTROCARDIOGRAM TRACING: CPT

## 2021-10-13 PROCEDURE — 74177 CT ABD & PELVIS W/CONTRAST: CPT | Mod: MA

## 2021-10-13 PROCEDURE — 93010 ELECTROCARDIOGRAM REPORT: CPT

## 2021-10-13 PROCEDURE — 82803 BLOOD GASES ANY COMBINATION: CPT

## 2021-10-13 PROCEDURE — 36415 COLL VENOUS BLD VENIPUNCTURE: CPT

## 2021-10-13 PROCEDURE — 84295 ASSAY OF SERUM SODIUM: CPT

## 2021-10-13 PROCEDURE — 71045 X-RAY EXAM CHEST 1 VIEW: CPT

## 2021-10-13 RX ORDER — SODIUM CHLORIDE 9 MG/ML
500 INJECTION INTRAMUSCULAR; INTRAVENOUS; SUBCUTANEOUS ONCE
Refills: 0 | Status: COMPLETED | OUTPATIENT
Start: 2021-10-13 | End: 2021-10-13

## 2021-10-13 RX ADMIN — SODIUM CHLORIDE 500 MILLILITER(S): 9 INJECTION INTRAMUSCULAR; INTRAVENOUS; SUBCUTANEOUS at 18:39

## 2021-10-13 NOTE — ED PROVIDER NOTE - OBJECTIVE STATEMENT
57 y/o F hx prior CVA 2/2 PFO, on asa/plavix, s/p PFO closure by Dr Wolff earlier this week here with general malaise, weakness, presyncope. States has had sx since sunday, gradually worsening. Feels dehydrated. Did not have full loc. No SOB. States no CP but "can feel her heart beating in her chest", does endorse abdominal pain, periumbilical/ruq, no known hx of stones. denies f/c, back pain, urinary sx, gi sx, trauma.

## 2021-10-13 NOTE — ED PROVIDER NOTE - ATTENDING CONTRIBUTION TO CARE
attending Vincenzo: 56yF h/o prior stroke (presented as word-finding difficulty, no residual), found to have PFO during stroke workup currently on asa/plavix, s/p PFO closure by Dr Wolff last week p/w general malaise and weakness x several days and episode of presyncope today. Feels dehydrated but reports taking PO. Denies LE edema, chest pain, SOB, prior DVT/PE. Tachycardic on arrival with mild RUQ tenderness. DDX including dehydration, PE, pericarditis/pericardial effusion, viral syndrome, biliary pathology. Will obtain ekg, place on tele, labs including d-dimer, cxr, discuss with cards, reassess  periumbilical/ruq, no known hx of stones. denies f/c, back pain, urinary sx, gi sx, trauma.

## 2021-10-13 NOTE — ED ADULT NURSE NOTE - NSIMPLEMENTINTERV_GEN_ALL_ED
Implemented All Fall with Harm Risk Interventions:  [place] to call system. Call bell, personal items and telephone within reach. Instruct patient to call for assistance. Room bathroom lighting operational. Non-slip footwear when patient is off stretcher. Physically safe environment: no spills, clutter or unnecessary equipment. Stretcher in lowest position, wheels locked, appropriate side rails in place. Provide visual cue, wrist band, yellow gown, etc. Monitor gait and stability. Monitor for mental status changes and reorient to person, place, and time. Review medications for side effects contributing to fall risk. Reinforce activity limits and safety measures with patient and family. Provide visual clues: red socks.

## 2021-10-13 NOTE — ED ADULT NURSE NOTE - OBJECTIVE STATEMENT
57 yo female with a PMH of stroke in July, recent surgery closure of PFO on Thursdya (10/07). As per patient, states that surgery went well and was d/c'd 55 yo female with a PMH of stroke in July, recent surgery closure of PFO on Thursdya (10/07). As per patient, states that surgery went well and was d/c'd but as of Monday began feeling lethargic and weak. Patient states that she did not have enough energy to get out of bed. Has a follow up patient on Friday, but states that today at around 1500 today, had a near syncopal episode. Patient appears tired, denies any CP or SOB. Is placed on a remote tele monitor at this time, MD Loya at bedside evaluating patient. Surgical site appears clean, no petechiae, bleeding present. Denies headache, dizziness, vision changes, chest pain, shortness of breath, abdominal pain, nausea, vomiting, diarrhea, fevers, chills, dysuria, hematuria, recent illness travel or fall.

## 2021-10-13 NOTE — ED PROVIDER NOTE - PROGRESS NOTE DETAILS
Popeye: d/w cards, main concern with this procedure would be for pericarditis or tamponade, pt without signs of pericarditis on EKG and no large effusion on CT. will dc, has f/u with  paco this week. results given to patient. Popeye: d/w cards, main concern with this procedure would be for pericarditis or tamponade, pt without signs of pericarditis on EKG and no large effusion on CT. will dc, has f/u with  paco this week. results given to patient, informed of gallstones

## 2021-10-13 NOTE — ED PROVIDER NOTE - NS ED ROS FT
Constitutional: (-) fever (-) vomiting  Eyes/ENT: (-) vision changes  Cardiovascular: (-) chest pain, (-) wheezing  Respiratory: (-) cough, (-) shortness of breath  Gastrointestinal: (-) vomiting, (-) diarrhea, (+) abdominal pain  : (-) dysuria   Musculoskeletal: +weakness  Integumentary: (-) rash, (-) edema  Neurological: (-)loc  Allergic/Immunologic: (-) pruritus  Endocrine: No history of thyroid disease

## 2021-10-13 NOTE — ED ADULT TRIAGE NOTE - CHIEF COMPLAINT QUOTE
C/o generalized weakness that began on Monday with near syncope around 3pm today   Had recent cardiac surgery (PFO closure) 10/07 with Dr Lan

## 2021-10-13 NOTE — ED PROVIDER NOTE - NSICDXPASTMEDICALHX_GEN_ALL_CORE_FT
PAST MEDICAL HISTORY:  Cryptogenic stroke     HLD (hyperlipidemia)     Migraines     Obesity (BMI 30-39.9)

## 2021-10-13 NOTE — ED PROVIDER NOTE - WR ORDER STATUS 1
[facility] Medicine Services  PROGRESS NOTE    Patient Name: Vargas De  : 1941  MRN: [id number]    Date of Admission: 2019  Length of Stay: 18  Primary Care Physician: Saba Arrington MD    Subjective   Subjective   CC:  Hospital follow up for AMS     HPI:  Patient lying in bed in NAD.  Patient is complaining of right shoulder pain, but falls right back to sleep.  Able to  hands and moves fingers well.  No swelling good cap refill in right hand distal to shoulder injury/her pain youngest son is in the room.  Positive BM.    Review of Systems  Gen- No fevers, chills  CV- No chest pain, palpitations  Resp- No cough, dyspnea  GI- No N/V/D, abd pain  Otherwise ROS is negative except as mentioned in the HPI.    Objective   Objective   Vital Signs:   Temp:  [97.6 °F (36.4 °C)-98.5 °F (36.9 °C)] 98.5 °F (36.9 °C)  Heart Rate:  [64-86] 67  Resp:  [16-20] 16  BP: (102-140)/(64-81) 117/69  Total (NIH Stroke Scale): 9  Physical Exam:  Constitutional: No acute distress, sleepy, in bed getting bathed, family at bedside  Head: NCAT  ENT: mucous membranes moist  Respiratory: Clear to auscultation bilaterally, respiratory effort normal on room air  Cardiovascular: RRR, II/VI murmurs, no R/G, +DP pedal pulses  Gastrointestinal: Positive bowel sounds, soft, nontender, nondistended  Musculoskeletal: no lower extremity edema noted, equal  in hands, numbness of the fingers (4th and 5th greater than 2nd and 3rd)  Skin: No rashes to exposed skin  Psych: Sleepy but arousable and pleaseant    Results Reviewed:  I have personally reviewed current lab, radiology, and data and agree.            Invalid input(s):  ALKPHOS, BILI  Estimated Creatinine Clearance: 81.5 mL/min (A) (by C-G formula based on SCr of 0.75 mg/dL (L)).    Microbiology Results Abnormal     Procedure Component Value - Date/Time    Blood Culture - Blood, Arm, Left [[id number]] Collected:  19 0152    Lab Status:   Final result Specimen:  Blood from Arm, Left Updated:  08/01/19 0431     Blood Culture No growth at 5 days    Blood Culture - Blood, Hand, Left [[id number]] Collected:  07/27/19 0147    Lab Status:  Final result Specimen:  Blood from Hand, Left Updated:  08/01/19 0245     Blood Culture No growth at 5 days    Urine Culture - Urine, Urine, Clean Catch [[id number]]  (Normal) Collected:  07/28/19 1430    Lab Status:  Final result Specimen:  Urine, Clean Catch Updated:  07/29/19 1856     Urine Culture No growth    Beta Strep Culture, Throat - Swab, Throat [[id number]]  (Normal) Collected:  07/27/19 1630    Lab Status:  Final result Specimen:  Swab from Throat Updated:  07/29/19 1355     Throat Culture, Beta Strep No Beta Hemolytic Streptococcus Isolated    Narrative:       Group A Strep incidence is low in adults. Positive culture for Beta hemolytic Streptococcus species can reflect colonization and not true infection. Please correlate clinically.    Blood Culture - Blood, Arm, Left [[id number]] Collected:  07/23/19 1334    Lab Status:  Final result Specimen:  Blood from Arm, Left Updated:  07/28/19 1415     Blood Culture No growth at 5 days    Blood Culture - Blood, Hand, Right [[id number]] Collected:  07/23/19 1341    Lab Status:  Final result Specimen:  Blood from Hand, Right Updated:  07/28/19 1415     Blood Culture No growth at 5 days    Rapid Strep A Screen - Swab, Throat [[id number]]  (Normal) Collected:  07/27/19 1630    Lab Status:  Final result Specimen:  Swab from Throat Updated:  07/27/19 1727     Strep A Ag Negative    Narrative:       Test performed by Direct Antigen Testing.        Imaging Results (last 24 hours)     ** No results found for the last 24 hours. **        Results for orders placed during the hospital encounter of 07/23/19   Adult Transthoracic Echo Complete W/ Cont if Necessary Per Protocol    Narrative · Left ventricular systolic function is normal. Estimated EF = 60%.  · Left ventricular  diastolic dysfunction (grade I) consistent with   impaired relaxation.  · Left atrial cavity size is borderline dilated.  · There is mild calcification of the aortic valve.        I have reviewed the medications:  Scheduled Meds:    acetaminophen 650 mg Oral Q6H   apixaban 5 mg Oral Q12H   atorvastatin 80 mg Oral Nightly   bisoprolol 5 mg Oral Q24H   diclofenac 2 g Topical 4x Daily   DULoxetine 20 mg Oral Daily   gabapentin 300 mg Oral Nightly   lidocaine 2 patch Transdermal Nightly   memantine 10 mg Oral Q12H   mesalamine 0.75 g Oral Nightly   nystatin  Topical Q12H   pantoprazole 40 mg Oral Q AM   predniSONE 5 mg Oral Daily With Breakfast   saccharomyces boulardii 250 mg Oral BID   sodium chloride 3 mL Intravenous Q12H     Continuous Infusions:     PRN Meds:.•  acetaminophen  •  calcium carbonate  •  gabapentin  •  ipratropium-albuterol  •  loperamide  •  magic mouthwash  •  melatonin  •  phenol  •  potassium chloride  •  potassium chloride  •  [DISCONTINUED] potassium chloride **OR** [DISCONTINUED] potassium chloride **OR** potassium chloride  •  saline  •  sodium chloride  •  sodium chloride    Assessment/Plan   Assessment / Plan     Active Hospital Problems    Diagnosis  POA   • **Acute alteration in mental status [R41.82]  Yes   • Lewy body dementia [G31.83, F02.80]  Yes   • Acute respiratory distress [R06.03]  Yes   • Fracture of proximal end of right humerus [S42.201A]  Yes   • Paroxysmal atrial fibrillation (CMS/HCC) [I48.0]  Yes   • Mixed hyperlipidemia [E78.2]  Yes   • Multiple lacunar infarcts [I63.81]  Yes   • History of stroke with residual deficit [I69.30]  Not Applicable   • PFO (patent foramen ovale) [Q21.1]  Not Applicable   • GERD without esophagitis [K21.9]  Yes   • Spinal stenosis in cervical region [M48.02]  Yes   • Essential hypertension [I10]  Yes   • Crohn's colitis, with rectal bleeding (CMS/HCC) [K50.111]  Yes      Resolved Hospital Problems   No resolved problems to display.     Brief  Hospital Course to date:  Vargas De is a 77 y.o. male with a past medical history significant for ulcerative colitis on chronic prednisone therapy, chronic pain with spinal stenosis of the cervical region, hyperlipidemia, hypertension, PFO, atrial fibrillation ( on Eliquis) mild dementia, and multiple lacunar infarcts who presents with acute change in mental status.  MRI negative for CVA.  EEG negative for seizures.     Sepsis  Acute hypoxic respiratory failure  Likely aspiration   - overall improved; fevers improved and on room air   - Patient was started on zosyn 7/28 - transitioned to Augmentin to complete 7 days   - BCx NGTD; strep negative   -Patient with swollen uvula with exudate.  CT scan without contrast shows narrowing of his airway. The scan without contrast not ordered as patient with iodine allergy; ENT eval; cont PEEP if needed; diminished oropharyngeal sensation  - Code status changed to DNR   - Speech eval 7/29 with modified diet; continue to monitor   - Palliative following       AMS  Suspected Lewy body dementia  - improved with discontinuation of Seroquel and narcotics   -  Neurology followed;  etiology likely worsening of lewy body dementia in setting of sleep deprivation, pain as well as recent fracture   - continue home statin, ASA  - Will resume home gabapentin qHS only - patient states this helped him rest     Orthostatic Hypotension  - noted with therapy this morning.    - will give one liter of normal saline  - reassess orthostatic blood pressures again tonight.      Antibiotic associated diarrhea  - No concern for C. difficile, no abdominal pain on exam stable.  - Diarrhea resolved off antibiotics     Dysphasia  - improving;  mental status is improving  - Speech eval 7/29 with diet modifications    Loose stools   - no WBC; no abd pain; monitor and consider c.diff testing      Right Humerus Fracture:  - consult to ortho, non-operative management, therapy as tolerated, appreciate  assistance   - continue with therapy as above  - Dr. Steinberg follow up in 10 days   -Nerve block discontinued.  Pain controlled on Tylenol.  - complaining of numbness of his fingers this morning.  This is likely due to ulnar nerve pressure around his elbow from the sling.  I have asked nursing staff to give him a break from the sling and perform some simple range of motion to see if it improves.       PAF:  - sinus on admission  - [id number] = 5. On Eliquis.      Junctional rhythml; tachycardia  - Resumed bisoprolol; cards consult and recommend continued beta blocker   - K replaced; check Mg      Frequent urination   - UA ok; PVR ok -- overall improving      Right wrist pain  -imaging with no acute fracture      History of CVA  - workup in May and CVA felt to be embolic; follows with Dr. Gutierrez and Dr. Maldonado. Has been on Eliquis and ASA discontinued       Chronic steroid:  - on 5 mg daily for Crohn's/lumbar stenosis. Currently being weaned down, to eventually discontinue; will resume home prednisone       HTN:  -Blood pressure has been on the low side.  Holding amlodipine and lisinopril for now.     DVT prophylaxis:   Eliquis     Disposition: [employer] denying acute rehab at [facility].  Family has filed an appeal.      CODE STATUS:   Code Status and Medical Interventions:   Ordered at: 07/28/19 1018     Limited Support to NOT Include:    Intubation     Level Of Support Discussed With:    Health Care Surrogate    Next of Kin (If No Surrogate)     Code Status:    No CPR     Medical Interventions (Level of Support Prior to Arrest):    Limited     Electronically signed by ANA MARÍA Milton, 08/10/19, 1:44 PM.       Resulted

## 2021-10-13 NOTE — ED ADULT NURSE REASSESSMENT NOTE - NS ED NURSE REASSESS COMMENT FT1
Pt A+Ox3, VSS, VBG and COVID swab collected and sent to lab. Pt resting comfortably in stretcher, and aware of plan of care. Pt pending CT results and US.

## 2021-10-13 NOTE — ED PROVIDER NOTE - CARE PLAN
1 Principal Discharge DX:	Fatigue   Principal Discharge DX:	Fatigue  Secondary Diagnosis:	Near syncope

## 2021-10-13 NOTE — ED PROVIDER NOTE - PHYSICAL EXAMINATION
Vitals: I have reviewed the patients vital signs  General: Well dressed, well appearing, no acute distress  HEENT: Atraumatic, normocephalic, airway patent  Eyes: EOMI, tracking appropriately  Neck: no tracheal deviation, no JVD  Chest/Lungs: no trauma, symmetric chest rise, speaking in complete sentences  Abd: soft, RUQ tenderness, no peritoneal signs  Heart: skin and extremities well perfused, tachy, 2+ pulses  Neuro: A+Ox3, CN intact, no FND  MSK: strength at baseline in all extremities, no muscle wasting or atrophy  Skin: no cyanosis, no jaundice, no new emergent lesions

## 2021-10-13 NOTE — ED PROVIDER NOTE - NSFOLLOWUPINSTRUCTIONS_ED_ALL_ED_FT
You came to the ER feeling unwell and we evaluated you with bloodwork, an EKG, and CT scans. We did not find an acute cause for your symptoms, but your results are reassuring. Your results are printed below. Please follow up with your cardiologist for further evaluation. Stay hydrated, and continue to take your medications as prescribed.    Return to the ER for new chest pain, difficulty breathing, passing out, fevers, swelling at the site of the procedure, or for any concern you would like evaluated. You came to the ER feeling unwell and we evaluated you with bloodwork, an EKG, and CT scans. We did not find an acute cause for your symptoms, but your results are reassuring. Your results are printed below. Please follow up with your cardiologist for further evaluation. Stay hydrated, and continue to take your medications as prescribed.    You do have gallstones, please follow up with your primary care doctor for further evaluation and possible referral to a surgeon as needed    Return to the ER for new chest pain, difficulty breathing, passing out, fevers, swelling at the site of the procedure, abdominal pain, or for any concern you would like evaluated.

## 2021-10-13 NOTE — ED PROVIDER NOTE - CLINICAL SUMMARY MEDICAL DECISION MAKING FREE TEXT BOX
55 y/o F hx cva 2/2 pfo, s/p recent PFO closure by Dr Wolff, here with general malaise, presyncope, abd pain since sunday. Has not had similar previously, no f/c, n/v/d, cp, sob, loc, trauma. Well appearing, tachy, strong pulses, CTAB, no muffled heart sounds. Broad differential could be viral syndrome or dehydration however cannot r/o PE with her hx, could be arrhythmia, electrolyte. With presentation less likely to be effusion, no tamponade physiology right now, however cannot r/o. With ruq pain will eval hepatobiliary causes. Basics, ekg, CTA chest, US, trop, urine. Re juwan, d/w Dr Wolff prior to dispo.

## 2021-10-13 NOTE — ED PROVIDER NOTE - PATIENT PORTAL LINK FT
You can access the FollowMyHealth Patient Portal offered by [facility] by registering at the following website: [email or web address]. By joining [employer]’s FollowMyHealth portal, you will also be able to view your health information using other applications (apps) compatible with our system.

## 2021-10-13 NOTE — ED PROVIDER NOTE - CARE PROVIDER_API CALL
Chidi Wolff)  Cardiology; Internal Medicine; Interventional Cardiology  [address] [place] [address]  Phone: [phone]  Fax: [phone]  Follow Up Time:

## 2021-10-14 VITALS
SYSTOLIC BLOOD PRESSURE: 135 MMHG | HEART RATE: 93 BPM | OXYGEN SATURATION: 98 % | TEMPERATURE: 98 F | DIASTOLIC BLOOD PRESSURE: 80 MMHG | RESPIRATION RATE: 18 BRPM

## 2021-10-14 LAB
RAPID RVP RESULT: SIGNIFICANT CHANGE UP
SARS-COV-2 RNA SPEC QL NAA+PROBE: SIGNIFICANT CHANGE UP

## 2021-10-14 PROCEDURE — 76705 ECHO EXAM OF ABDOMEN: CPT | Mod: 26,RT

## 2021-10-15 ENCOUNTER — APPOINTMENT (OUTPATIENT)
Dept: CARDIOLOGY | Facility: CLINIC | Age: 56
End: 2021-10-15

## 2021-10-18 ENCOUNTER — APPOINTMENT (OUTPATIENT)
Dept: CARDIOLOGY | Facility: CLINIC | Age: 56
End: 2021-10-18
Payer: COMMERCIAL

## 2021-10-18 ENCOUNTER — NON-APPOINTMENT (OUTPATIENT)
Age: 56
End: 2021-10-18

## 2021-10-18 VITALS
HEART RATE: 84 BPM | SYSTOLIC BLOOD PRESSURE: 182 MMHG | HEIGHT: 70 IN | WEIGHT: 263 LBS | BODY MASS INDEX: 37.65 KG/M2 | OXYGEN SATURATION: 100 % | DIASTOLIC BLOOD PRESSURE: 82 MMHG | RESPIRATION RATE: 18 BRPM

## 2021-10-18 PROCEDURE — 99214 OFFICE O/P EST MOD 30 MIN: CPT

## 2021-10-18 PROCEDURE — 93000 ELECTROCARDIOGRAM COMPLETE: CPT

## 2021-10-18 NOTE — HISTORY OF PRESENT ILLNESS
[FreeTextEntry1] : 56 year old woman underwent PFO closure 10/7/2021 for cryptogenic stroke.  Several days post procedure, felt dizzy, lightheaded.  She was seen in the [facility] ED and sent home.  However, elevation of LFT's was present.  Currently feeling better, no chest discomfort, SOB, palpitations or constitutional symptoms.

## 2021-10-20 ENCOUNTER — APPOINTMENT (OUTPATIENT)
Dept: NEUROLOGY | Facility: CLINIC | Age: 56
End: 2021-10-20

## 2021-11-11 ENCOUNTER — APPOINTMENT (OUTPATIENT)
Dept: NEUROLOGY | Facility: CLINIC | Age: 56
End: 2021-11-11
Payer: COMMERCIAL

## 2021-11-11 VITALS
WEIGHT: 260 LBS | HEART RATE: 83 BPM | DIASTOLIC BLOOD PRESSURE: 94 MMHG | BODY MASS INDEX: 37.22 KG/M2 | HEIGHT: 70 IN | SYSTOLIC BLOOD PRESSURE: 170 MMHG

## 2021-11-11 PROCEDURE — 99215 OFFICE O/P EST HI 40 MIN: CPT

## 2021-11-11 RX ORDER — ATORVASTATIN CALCIUM 40 MG/1
40 TABLET, FILM COATED ORAL
Qty: 30 | Refills: 3 | Status: ACTIVE | COMMUNITY
Start: 2021-08-05 | End: 1900-01-01

## 2021-11-22 ENCOUNTER — RX RENEWAL (OUTPATIENT)
Age: 56
End: 2021-11-22

## 2021-12-07 NOTE — DISCHARGE NOTE NURSING/CASE MANAGEMENT/SOCIAL WORK - NSDCPEPTSTRK_GEN_ALL_CORE
Call 911 for stroke/Need for follow up after discharge/Prescribed medications/Risk factors for stroke/Stroke education booklet/Stroke support groups for patients, families, and friends/Stroke warning signs and symptoms/Signs and symptoms of stroke knees, entire back, posterior neck, scalp, upper lip, and upper arms

## 2022-01-24 NOTE — ED ADULT NURSE NOTE - NSIMPLEMENTINTERV_GEN_ALL_ED
<-- Click to add NO significant Past Surgical History Implemented All Universal Safety Interventions:  Roxana to call system. Call bell, personal items and telephone within reach. Instruct patient to call for assistance. Room bathroom lighting operational. Non-slip footwear when patient is off stretcher. Physically safe environment: no spills, clutter or unnecessary equipment. Stretcher in lowest position, wheels locked, appropriate side rails in place.

## 2022-01-27 ENCOUNTER — APPOINTMENT (OUTPATIENT)
Dept: CARDIOLOGY | Facility: CLINIC | Age: 57
End: 2022-01-27
Payer: COMMERCIAL

## 2022-01-27 ENCOUNTER — NON-APPOINTMENT (OUTPATIENT)
Age: 57
End: 2022-01-27

## 2022-01-27 VITALS — SYSTOLIC BLOOD PRESSURE: 151 MMHG | OXYGEN SATURATION: 100 % | HEART RATE: 70 BPM | DIASTOLIC BLOOD PRESSURE: 87 MMHG

## 2022-01-27 PROCEDURE — 99214 OFFICE O/P EST MOD 30 MIN: CPT

## 2022-01-27 PROCEDURE — 93000 ELECTROCARDIOGRAM COMPLETE: CPT

## 2022-01-27 RX ORDER — CLOPIDOGREL 75 MG/1
75 TABLET, FILM COATED ORAL DAILY
Qty: 90 | Refills: 3 | Status: DISCONTINUED | COMMUNITY
End: 2022-01-27

## 2022-01-27 RX ORDER — HYDROCHLOROTHIAZIDE 25 MG/1
25 TABLET ORAL
Qty: 90 | Refills: 3 | Status: ACTIVE | COMMUNITY
Start: 2022-01-27 | End: 1900-01-01

## 2022-01-27 NOTE — DISCUSSION/SUMMARY
[FreeTextEntry1] : Cryptogenic CVA s/p PFO closure.  Follow up echo ordered.  May stop clopidogrel at this point.  HTN.  HCTZ 25 mg ordered.

## 2022-01-27 NOTE — HISTORY OF PRESENT ILLNESS
[FreeTextEntry1] : 56 year old woman s/p PFO closure 10/2021. Post procedure, she had nausea, abdominal pain and LFT elevation.  Now resolved but felt duie to cholecystitis and she is scheduled for surgery.  She has no cardiac symptoms such as palpitations, chest discomfort, KRISHNA.  She was on telmisartan for BP but stopped it due to side effects.

## 2022-02-24 ENCOUNTER — APPOINTMENT (OUTPATIENT)
Dept: CARDIOLOGY | Facility: CLINIC | Age: 57
End: 2022-02-24
Payer: COMMERCIAL

## 2022-02-24 DIAGNOSIS — Z86.73 PERSONAL HISTORY OF TRANSIENT ISCHEMIC ATTACK (TIA), AND CEREBRAL INFARCTION W/OUT RESIDUAL DEFICITS: ICD-10-CM

## 2022-02-24 DIAGNOSIS — I47.1 SUPRAVENTRICULAR TACHYCARDIA: ICD-10-CM

## 2022-02-24 DIAGNOSIS — Z87.74 PERSONAL HISTORY OF (CORRECTED) CONGENITAL MALFORMATIONS OF HEART AND CIRCULATORY SYSTEM: ICD-10-CM

## 2022-02-24 DIAGNOSIS — I10 ESSENTIAL (PRIMARY) HYPERTENSION: ICD-10-CM

## 2022-02-24 PROCEDURE — 93306 TTE W/DOPPLER COMPLETE: CPT

## 2022-03-18 PROBLEM — Z87.74 S/P PERCUTANEOUS PATENT FORAMEN OVALE CLOSURE: Status: ACTIVE | Noted: 2021-10-18

## 2022-03-18 PROBLEM — I10 HYPERTENSION, UNCONTROLLED: Status: ACTIVE | Noted: 2017-12-18

## 2022-03-18 PROBLEM — Z86.73 STATUS POST CVA: Status: ACTIVE | Noted: 2021-08-02

## 2022-03-18 PROBLEM — I47.1 PSVT (PAROXYSMAL SUPRAVENTRICULAR TACHYCARDIA): Status: ACTIVE | Noted: 2021-09-22

## 2022-04-11 ENCOUNTER — APPOINTMENT (OUTPATIENT)
Dept: NEUROLOGY | Facility: CLINIC | Age: 57
End: 2022-04-11
Payer: COMMERCIAL

## 2022-04-11 DIAGNOSIS — Q21.1 ATRIAL SEPTAL DEFECT: ICD-10-CM

## 2022-04-11 DIAGNOSIS — I63.9 CEREBRAL INFARCTION, UNSPECIFIED: ICD-10-CM

## 2022-04-11 DIAGNOSIS — I63.412 CEREBRAL INFARCTION DUE TO EMBOLISM OF LEFT MIDDLE CEREBRAL ARTERY: ICD-10-CM

## 2022-04-11 PROCEDURE — 93892 TCD EMBOLI DETECT W/O INJ: CPT

## 2022-04-11 PROCEDURE — 99213 OFFICE O/P EST LOW 20 MIN: CPT | Mod: 95

## 2022-04-11 PROCEDURE — 93880 EXTRACRANIAL BILAT STUDY: CPT

## 2022-04-11 PROCEDURE — 93886 INTRACRANIAL COMPLETE STUDY: CPT

## 2022-04-15 ENCOUNTER — APPOINTMENT (OUTPATIENT)
Dept: NEUROLOGY | Facility: CLINIC | Age: 57
End: 2022-04-15

## 2022-07-26 ENCOUNTER — APPOINTMENT (OUTPATIENT)
Dept: ORTHOPEDIC SURGERY | Facility: CLINIC | Age: 57
End: 2022-07-26
Payer: COMMERCIAL

## 2022-07-26 VITALS — BODY MASS INDEX: 27.92 KG/M2 | HEIGHT: 70 IN | WEIGHT: 195 LBS

## 2022-07-26 DIAGNOSIS — M19.071 PRIMARY OSTEOARTHRITIS, RIGHT ANKLE AND FOOT: ICD-10-CM

## 2022-07-26 DIAGNOSIS — M17.12 UNILATERAL PRIMARY OSTEOARTHRITIS, LEFT KNEE: ICD-10-CM

## 2022-07-26 DIAGNOSIS — M17.11 UNILATERAL PRIMARY OSTEOARTHRITIS, RIGHT KNEE: ICD-10-CM

## 2022-07-26 PROCEDURE — 99214 OFFICE O/P EST MOD 30 MIN: CPT | Mod: 25

## 2022-07-26 PROCEDURE — 73630 X-RAY EXAM OF FOOT: CPT | Mod: RT

## 2022-07-26 PROCEDURE — 20605 DRAIN/INJ JOINT/BURSA W/O US: CPT

## 2022-07-26 NOTE — PHYSICAL EXAM
[Left] : left knee [NL (0)] : extension 0 degrees [Right] : right foot and ankle [Mild] : mild swelling of dorsal foot [3rd] : 3rd [2nd] : 2nd [5___] : eversion 5[unfilled]/5 [2+] : dorsalis pedis pulse: 2+ [TWNoteComboBox7] : flexion 120 degrees [de-identified] : extension 3 degrees [] : no erythema [FreeTextEntry9] : good motion

## 2022-07-26 NOTE — DISCUSSION/SUMMARY
[de-identified] : The patient was advised of the diagnosis. The natural history of the pathology was explained in full to the patient in layman's terms. All questions were answered. The risks and benefits of surgical and non-surgical treatment alternatives were explained in full to the patient.\par \par Will submit for Visco-3 injections via [facility].\par \par Renewal of patient handicap parking completed. She requires a can for ambulation as she is not able to walk for prolonged distances.\par \par Needs handicapped parking due to severe bone on bone oa r knee and moderate to severe oa left knee. also noted severe mid foot  oa r foot that requires injection.  needs r total knee which due to stroke cannot have surgery at this time. Cane is needed for ambulation. \par \par Foot may need to see foot and ankle surgeon.

## 2022-07-26 NOTE — HISTORY OF PRESENT ILLNESS
[8] : 8 [Dull/Aching] : dull/aching [Tightness] : tightness [Full time] : Work status: full time [] : yes [de-identified] : Follow up today. Symptoms continue and state the left knee has been progressively worsening. No new injury. Pain with ambulation and limited ROM. Right knee remains at baseline. Visco injections were denied. She also c/o intermittent pain in the right foot for the past few months. No specific injury. Describes pain at the top of the foot with swelling. History of OA. [FreeTextEntry1] : bilateral knees/right foot [FreeTextEntry5] : Denies recent injury. Patient last saw Dr. Dunn on 1/25/22 for the same problem. Has a history of osteoarthritis in both knees. Has a history of arthroscopy on both knees. [FreeTextEntry6] : swelling [de-identified] : none

## 2022-07-26 NOTE — PROCEDURE
[FreeTextEntry3] : Trigger Point was performed in the r foot  because of pain and inflammation and oa mid foot. Anesthesia: ethyl chloride sprayed topically.\par Decadron: An injection of Decadron 4 mg , \par Kenalog: An injection of Kenalog 5 mg , \par Lidocaine: 2 cc. \par \par Patient has tried OTC's including aspirin, Ibuprofen, Aleve etc or prescription NSAIDS, and/or exercises at home and/ or physical therapy without satisfactory response. The risks, benefits, and alternatives to cortisone injection were explained in full to the patient. Risks outlined include but are not limited to infection, sepsis, bleeding, scarring, skin discoloration, temporary increase in pain, syncopal episode, failure to resolve symptoms, allergic reaction, and symptom recurrence. Patient understands the risks. All questions were answered. After discussion of options, patient requested an injection. Oral informed consent was obtained. Sterile technique was utilized for the procedure including the preparation of the solutions used for the injection. Injection site was prepped with betadine and alcohol. Ethyl chloride sprayed topically. Sterile technique used. Patient tolerated procedure well. \par \par Post Procedure Instructions: Patient was advised to call if redness, pain, or fever occur. Apply ice for 15 min. every 2 hours for the next 12-24 hours as tolerated. Patient was advised to rest as much as possible for 1 to 2 days.\par

## 2022-07-26 NOTE — IMAGING
[Right] : right foot [Degenerative change] : Degenerative change [FreeTextEntry9] : spuring navicular

## 2022-07-28 ENCOUNTER — APPOINTMENT (OUTPATIENT)
Dept: CARDIOLOGY | Facility: CLINIC | Age: 57
End: 2022-07-28

## 2022-08-11 ENCOUNTER — FORM ENCOUNTER (OUTPATIENT)
Age: 57
End: 2022-08-11

## 2022-08-12 ENCOUNTER — APPOINTMENT (OUTPATIENT)
Dept: MRI IMAGING | Facility: CLINIC | Age: 57
End: 2022-08-12

## 2022-08-12 PROCEDURE — 73721 MRI JNT OF LWR EXTRE W/O DYE: CPT | Mod: LT

## 2022-09-22 ENCOUNTER — APPOINTMENT (OUTPATIENT)
Dept: CARDIOLOGY | Facility: CLINIC | Age: 57
End: 2022-09-22
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
## 2022-11-16 RX ORDER — ATORVASTATIN CALCIUM 40 MG/1
40 TABLET, FILM COATED ORAL
Qty: 30 | Refills: 3 | Status: ACTIVE | COMMUNITY
Start: 2022-04-10 | End: 1900-01-01

## 2022-12-20 ENCOUNTER — APPOINTMENT (OUTPATIENT)
Dept: NEUROLOGY | Facility: CLINIC | Age: 57
End: 2022-12-20

## 2022-12-22 ENCOUNTER — APPOINTMENT (OUTPATIENT)
Dept: CARDIOLOGY | Facility: CLINIC | Age: 57
End: 2022-12-22

## 2022-12-28 ENCOUNTER — APPOINTMENT (OUTPATIENT)
Dept: NEUROLOGY | Facility: CLINIC | Age: 57
End: 2022-12-28

## 2023-01-04 ENCOUNTER — APPOINTMENT (OUTPATIENT)
Dept: NEUROLOGY | Facility: CLINIC | Age: 58
End: 2023-01-04

## 2023-01-18 ENCOUNTER — TRANSCRIPTION ENCOUNTER (OUTPATIENT)
Age: 58
End: 2023-01-18

## 2023-01-18 ENCOUNTER — INPATIENT (INPATIENT)
Facility: HOSPITAL | Age: 58
LOS: 1 days | Discharge: HOME | End: 2023-01-20
Attending: STUDENT IN AN ORGANIZED HEALTH CARE EDUCATION/TRAINING PROGRAM | Admitting: STUDENT IN AN ORGANIZED HEALTH CARE EDUCATION/TRAINING PROGRAM
Payer: COMMERCIAL

## 2023-01-18 VITALS
DIASTOLIC BLOOD PRESSURE: 72 MMHG | TEMPERATURE: 98 F | HEART RATE: 110 BPM | RESPIRATION RATE: 15 BRPM | OXYGEN SATURATION: 100 % | SYSTOLIC BLOOD PRESSURE: 154 MMHG

## 2023-01-18 DIAGNOSIS — Z98.890 OTHER SPECIFIED POSTPROCEDURAL STATES: Chronic | ICD-10-CM

## 2023-01-18 DIAGNOSIS — K80.20 CALCULUS OF GALLBLADDER WITHOUT CHOLECYSTITIS WITHOUT OBSTRUCTION: ICD-10-CM

## 2023-01-18 LAB
ALBUMIN SERPL ELPH-MCNC: 3.5 G/DL — SIGNIFICANT CHANGE UP (ref 3.3–5)
ALP SERPL-CCNC: 89 U/L — SIGNIFICANT CHANGE UP (ref 40–120)
ALT FLD-CCNC: 19 U/L — SIGNIFICANT CHANGE UP (ref 4–33)
ANION GAP SERPL CALC-SCNC: 10 MMOL/L — SIGNIFICANT CHANGE UP (ref 7–14)
APTT BLD: 23.4 SEC — LOW (ref 27–36.3)
AST SERPL-CCNC: 21 U/L — SIGNIFICANT CHANGE UP (ref 4–32)
BASOPHILS # BLD AUTO: 0.01 K/UL — SIGNIFICANT CHANGE UP (ref 0–0.2)
BASOPHILS NFR BLD AUTO: 0.1 % — SIGNIFICANT CHANGE UP (ref 0–2)
BILIRUB SERPL-MCNC: 0.3 MG/DL — SIGNIFICANT CHANGE UP (ref 0.2–1.2)
BLD GP AB SCN SERPL QL: NEGATIVE — SIGNIFICANT CHANGE UP
BLOOD GAS VENOUS COMPREHENSIVE RESULT: SIGNIFICANT CHANGE UP
BUN SERPL-MCNC: 18 MG/DL — SIGNIFICANT CHANGE UP (ref 7–23)
CALCIUM SERPL-MCNC: 10.2 MG/DL — SIGNIFICANT CHANGE UP (ref 8.4–10.5)
CHLORIDE SERPL-SCNC: 104 MMOL/L — SIGNIFICANT CHANGE UP (ref 98–107)
CO2 SERPL-SCNC: 23 MMOL/L — SIGNIFICANT CHANGE UP (ref 22–31)
CREAT SERPL-MCNC: 0.66 MG/DL — SIGNIFICANT CHANGE UP (ref 0.5–1.3)
EGFR: 103 ML/MIN/1.73M2 — SIGNIFICANT CHANGE UP
EOSINOPHIL # BLD AUTO: 0.03 K/UL — SIGNIFICANT CHANGE UP (ref 0–0.5)
EOSINOPHIL NFR BLD AUTO: 0.4 % — SIGNIFICANT CHANGE UP (ref 0–6)
FLUAV AG NPH QL: SIGNIFICANT CHANGE UP
FLUBV AG NPH QL: SIGNIFICANT CHANGE UP
GLUCOSE SERPL-MCNC: 103 MG/DL — HIGH (ref 70–99)
HCT VFR BLD CALC: 35.6 % — SIGNIFICANT CHANGE UP (ref 34.5–45)
HGB BLD-MCNC: 11 G/DL — LOW (ref 11.5–15.5)
IANC: 5.26 K/UL — SIGNIFICANT CHANGE UP (ref 1.8–7.4)
IMM GRANULOCYTES NFR BLD AUTO: 0.3 % — SIGNIFICANT CHANGE UP (ref 0–0.9)
INR BLD: 1.1 RATIO — SIGNIFICANT CHANGE UP (ref 0.88–1.16)
LIDOCAIN IGE QN: 17 U/L — SIGNIFICANT CHANGE UP (ref 7–60)
LYMPHOCYTES # BLD AUTO: 1.57 K/UL — SIGNIFICANT CHANGE UP (ref 1–3.3)
LYMPHOCYTES # BLD AUTO: 21 % — SIGNIFICANT CHANGE UP (ref 13–44)
MCHC RBC-ENTMCNC: 25.3 PG — LOW (ref 27–34)
MCHC RBC-ENTMCNC: 30.9 GM/DL — LOW (ref 32–36)
MCV RBC AUTO: 81.8 FL — SIGNIFICANT CHANGE UP (ref 80–100)
MONOCYTES # BLD AUTO: 0.59 K/UL — SIGNIFICANT CHANGE UP (ref 0–0.9)
MONOCYTES NFR BLD AUTO: 7.9 % — SIGNIFICANT CHANGE UP (ref 2–14)
NEUTROPHILS # BLD AUTO: 5.26 K/UL — SIGNIFICANT CHANGE UP (ref 1.8–7.4)
NEUTROPHILS NFR BLD AUTO: 70.3 % — SIGNIFICANT CHANGE UP (ref 43–77)
NRBC # BLD: 0 /100 WBCS — SIGNIFICANT CHANGE UP (ref 0–0)
NRBC # FLD: 0 K/UL — SIGNIFICANT CHANGE UP (ref 0–0)
PLATELET # BLD AUTO: 271 K/UL — SIGNIFICANT CHANGE UP (ref 150–400)
POTASSIUM SERPL-MCNC: 4.5 MMOL/L — SIGNIFICANT CHANGE UP (ref 3.5–5.3)
POTASSIUM SERPL-SCNC: 4.5 MMOL/L — SIGNIFICANT CHANGE UP (ref 3.5–5.3)
PROT SERPL-MCNC: 7.6 G/DL — SIGNIFICANT CHANGE UP (ref 6–8.3)
PROTHROM AB SERPL-ACNC: 12.8 SEC — SIGNIFICANT CHANGE UP (ref 10.5–13.4)
RBC # BLD: 4.35 M/UL — SIGNIFICANT CHANGE UP (ref 3.8–5.2)
RBC # FLD: 12.9 % — SIGNIFICANT CHANGE UP (ref 10.3–14.5)
RH IG SCN BLD-IMP: NEGATIVE — SIGNIFICANT CHANGE UP
RSV RNA NPH QL NAA+NON-PROBE: SIGNIFICANT CHANGE UP
SARS-COV-2 RNA SPEC QL NAA+PROBE: SIGNIFICANT CHANGE UP
SODIUM SERPL-SCNC: 137 MMOL/L — SIGNIFICANT CHANGE UP (ref 135–145)
WBC # BLD: 7.48 K/UL — SIGNIFICANT CHANGE UP (ref 3.8–10.5)
WBC # FLD AUTO: 7.48 K/UL — SIGNIFICANT CHANGE UP (ref 3.8–10.5)

## 2023-01-18 PROCEDURE — 76705 ECHO EXAM OF ABDOMEN: CPT | Mod: 26

## 2023-01-18 PROCEDURE — 99285 EMERGENCY DEPT VISIT HI MDM: CPT

## 2023-01-18 RX ORDER — FAMOTIDINE 10 MG/ML
1 INJECTION INTRAVENOUS
Qty: 0 | Refills: 0 | DISCHARGE

## 2023-01-18 RX ORDER — HYDROMORPHONE HYDROCHLORIDE 2 MG/ML
0.5 INJECTION INTRAMUSCULAR; INTRAVENOUS; SUBCUTANEOUS EVERY 6 HOURS
Refills: 0 | Status: DISCONTINUED | OUTPATIENT
Start: 2023-01-18 | End: 2023-01-19

## 2023-01-18 RX ORDER — FAMOTIDINE 10 MG/ML
20 INJECTION INTRAVENOUS ONCE
Refills: 0 | Status: COMPLETED | OUTPATIENT
Start: 2023-01-18 | End: 2023-01-18

## 2023-01-18 RX ORDER — HYDROMORPHONE HYDROCHLORIDE 2 MG/ML
0.25 INJECTION INTRAMUSCULAR; INTRAVENOUS; SUBCUTANEOUS EVERY 6 HOURS
Refills: 0 | Status: DISCONTINUED | OUTPATIENT
Start: 2023-01-18 | End: 2023-01-19

## 2023-01-18 RX ORDER — SODIUM CHLORIDE 9 MG/ML
1000 INJECTION, SOLUTION INTRAVENOUS
Refills: 0 | Status: DISCONTINUED | OUTPATIENT
Start: 2023-01-18 | End: 2023-01-20

## 2023-01-18 RX ORDER — TELMISARTAN 20 MG/1
1 TABLET ORAL
Qty: 0 | Refills: 0 | DISCHARGE

## 2023-01-18 RX ORDER — METRONIDAZOLE 500 MG
500 TABLET ORAL EVERY 8 HOURS
Refills: 0 | Status: DISCONTINUED | OUTPATIENT
Start: 2023-01-19 | End: 2023-01-19

## 2023-01-18 RX ORDER — CIPROFLOXACIN LACTATE 400MG/40ML
400 VIAL (ML) INTRAVENOUS EVERY 12 HOURS
Refills: 0 | Status: DISCONTINUED | OUTPATIENT
Start: 2023-01-19 | End: 2023-01-19

## 2023-01-18 RX ORDER — INFLUENZA VIRUS VACCINE 15; 15; 15; 15 UG/.5ML; UG/.5ML; UG/.5ML; UG/.5ML
0.5 SUSPENSION INTRAMUSCULAR ONCE
Refills: 0 | Status: DISCONTINUED | OUTPATIENT
Start: 2023-01-18 | End: 2023-01-20

## 2023-01-18 RX ORDER — CIPROFLOXACIN LACTATE 400MG/40ML
VIAL (ML) INTRAVENOUS
Refills: 0 | Status: DISCONTINUED | OUTPATIENT
Start: 2023-01-18 | End: 2023-01-19

## 2023-01-18 RX ORDER — SODIUM CHLORIDE 9 MG/ML
1000 INJECTION INTRAMUSCULAR; INTRAVENOUS; SUBCUTANEOUS ONCE
Refills: 0 | Status: COMPLETED | OUTPATIENT
Start: 2023-01-18 | End: 2023-01-18

## 2023-01-18 RX ORDER — METRONIDAZOLE 500 MG
500 TABLET ORAL ONCE
Refills: 0 | Status: COMPLETED | OUTPATIENT
Start: 2023-01-18 | End: 2023-01-18

## 2023-01-18 RX ORDER — ONDANSETRON 8 MG/1
4 TABLET, FILM COATED ORAL ONCE
Refills: 0 | Status: COMPLETED | OUTPATIENT
Start: 2023-01-18 | End: 2023-01-18

## 2023-01-18 RX ORDER — METRONIDAZOLE 500 MG
TABLET ORAL
Refills: 0 | Status: DISCONTINUED | OUTPATIENT
Start: 2023-01-18 | End: 2023-01-19

## 2023-01-18 RX ORDER — CIPROFLOXACIN LACTATE 400MG/40ML
400 VIAL (ML) INTRAVENOUS ONCE
Refills: 0 | Status: COMPLETED | OUTPATIENT
Start: 2023-01-18 | End: 2023-01-18

## 2023-01-18 RX ORDER — MORPHINE SULFATE 50 MG/1
4 CAPSULE, EXTENDED RELEASE ORAL ONCE
Refills: 0 | Status: DISCONTINUED | OUTPATIENT
Start: 2023-01-18 | End: 2023-01-18

## 2023-01-18 RX ORDER — ENOXAPARIN SODIUM 100 MG/ML
40 INJECTION SUBCUTANEOUS EVERY 24 HOURS
Refills: 0 | Status: DISCONTINUED | OUTPATIENT
Start: 2023-01-18 | End: 2023-01-20

## 2023-01-18 RX ORDER — ATORVASTATIN CALCIUM 80 MG/1
40 TABLET, FILM COATED ORAL AT BEDTIME
Refills: 0 | Status: DISCONTINUED | OUTPATIENT
Start: 2023-01-18 | End: 2023-01-20

## 2023-01-18 RX ORDER — ACETAMINOPHEN 500 MG
1000 TABLET ORAL EVERY 6 HOURS
Refills: 0 | Status: DISCONTINUED | OUTPATIENT
Start: 2023-01-18 | End: 2023-01-19

## 2023-01-18 RX ADMIN — Medication 400 MILLIGRAM(S): at 23:49

## 2023-01-18 RX ADMIN — SODIUM CHLORIDE 100 MILLILITER(S): 9 INJECTION, SOLUTION INTRAVENOUS at 22:59

## 2023-01-18 RX ADMIN — Medication 100 MILLIGRAM(S): at 22:07

## 2023-01-18 RX ADMIN — SODIUM CHLORIDE 100 MILLILITER(S): 9 INJECTION, SOLUTION INTRAVENOUS at 23:49

## 2023-01-18 RX ADMIN — MORPHINE SULFATE 4 MILLIGRAM(S): 50 CAPSULE, EXTENDED RELEASE ORAL at 16:18

## 2023-01-18 RX ADMIN — ONDANSETRON 4 MILLIGRAM(S): 8 TABLET, FILM COATED ORAL at 06:58

## 2023-01-18 RX ADMIN — SODIUM CHLORIDE 1000 MILLILITER(S): 9 INJECTION INTRAMUSCULAR; INTRAVENOUS; SUBCUTANEOUS at 06:59

## 2023-01-18 RX ADMIN — MORPHINE SULFATE 4 MILLIGRAM(S): 50 CAPSULE, EXTENDED RELEASE ORAL at 06:58

## 2023-01-18 RX ADMIN — ENOXAPARIN SODIUM 40 MILLIGRAM(S): 100 INJECTION SUBCUTANEOUS at 22:13

## 2023-01-18 RX ADMIN — ATORVASTATIN CALCIUM 40 MILLIGRAM(S): 80 TABLET, FILM COATED ORAL at 22:59

## 2023-01-18 RX ADMIN — MORPHINE SULFATE 4 MILLIGRAM(S): 50 CAPSULE, EXTENDED RELEASE ORAL at 15:48

## 2023-01-18 RX ADMIN — Medication 200 MILLIGRAM(S): at 20:55

## 2023-01-18 RX ADMIN — FAMOTIDINE 20 MILLIGRAM(S): 10 INJECTION INTRAVENOUS at 06:58

## 2023-01-18 NOTE — ED PROVIDER NOTE - CLINICAL SUMMARY MEDICAL DECISION MAKING FREE TEXT BOX
61-year-old female history of hypertension, high cholesterol, embolic stroke, gallstones, presenting with persistent right upper quadrant pain.  Primary concern for Ghislaine lithiasis versus cholecystitis versus choledocholithiasis versus cholangitis in the setting of right upper quadrant pain with associated midepigastric radiating to left upper quadrant pain.  No generalized symptoms however on point-of-care ultrasound high clinical concern for stone in neck with no improvement with movement on the left lateral decubitus.  Will work-up with common labs, lipase, ultrasound of right upper quadrant.  Prior history and physical and procedure notes reviewed in the creation of this assessment and plan.

## 2023-01-18 NOTE — ED ADULT NURSE REASSESSMENT NOTE - NS ED NURSE REASSESS COMMENT FT1
Pt is A&Ox4, ambulatory presenting to the ED with c/o RUQ pain x 2 days. Pt admitted to surgery for gallstones. Pt resting in stretcher with c/o h/a and pain rating 7/10. Pt medicated as ordered, see MAR. Respirations even and unlabored, chest rise equal b/l. Abdomen soft, nondistended, but tender in the RUQ. Pt denies chest pain, SOB, fever, cough, chills, N/V/D or any urinary symptoms. IVL patent and intact. Safety maintained throughout. Will continue to monitor.
Pt received from outgoing RN in stretcher in no apparent distress. denies discomfort. Respirations even, non-labored. Skin warm, dry, color appropriate. Pending sx consult. call bell within reach. Education provided to pt on how to and when to use call bell for assistance. Will continue to monitor. JOHN Deng

## 2023-01-18 NOTE — H&P ADULT - NSHPPHYSICALEXAM_GEN_ALL_CORE
General: NAD  Neuro: A/Ox4  HEENT: NC/AT  Respiratory: RA, no increased work of breathing  CV: RRR  Abdomen: Soft, Non distended, TTP RUQ, + noriega, (-) guarding or rebound   Extremities: WWP, w/o gross deformity, GHOSH  Vascular: b/l radial pulses palpable   Skin: intact, w/o breakdown

## 2023-01-18 NOTE — ED ADULT NURSE NOTE - OBJECTIVE STATEMENT
Pt presents to ED A&04 ambulatory at baseline coming in complaining of RUQ abdominal pain x2 days. PMH of gallstones. Respirations even and unlabored. Lung sounds clear with equal chest rise bilaterally. ABD is soft, non distended with normal active bowel sounds. No complaints of chest pain, headache, nausea, dizziness, vomiting  SOB, fever, chills verbalized. 22GLwrist.

## 2023-01-18 NOTE — ED PROVIDER NOTE - OBJECTIVE STATEMENT
57-year-old female history of hypertension high cholesterol embolic stroke with fixed PFO, presenting with persistent right upper quadrant pain of last 2 days in the setting of prior history of gallstones.  States 2 days ago experienced new onset right upper quadrant pain worse with any p.o. attempt, radiating to midepigastric and LUQ, no other exacerbating features, denies any associated fever, chills, nausea, vomiting, diarrhea, constipation.

## 2023-01-18 NOTE — ED ADULT NURSE NOTE - CHIEF COMPLAINT QUOTE
presents C/O upper abd pain. " feels similar to gallstones" No complaints of chest pain, headache, nausea, dizziness, vomiting  SOB, fever, chills verbalized. Pmhx HLD HTN CVA 7/21
I will STOP taking the medications listed below when I get home from the hospital:    folic acid 1 mg oral tablet  -- 1 tab(s) by mouth once a day    lisinopril 10 mg oral tablet  -- 1 tab(s) by mouth once a day    NIFEdipine 30 mg oral tablet, extended release  -- 1 tab(s) by mouth once a day    thiamine 100 mg oral tablet  -- 1 tab(s) by mouth once a day    DAPTOmycin  -- 500 milligram(s) intravenous every other day

## 2023-01-18 NOTE — ED ADULT NURSE NOTE - DRUG PRE-SCREENING (DAST -1)
31 1/7 week twin A.   BW 1760 grams.     10/20 (DOL 7) HUS- normal    Plan:  ROP at 35 weeks corrected (11/10).  Repeat HUS prior to discharge or 36-40 weeks' postmenstrual age.  Early steps as outpatient.   Statement Selected

## 2023-01-18 NOTE — H&P ADULT - ATTENDING COMMENTS
DATE OF SERVICE: 01-18-23 @ 20:35    Seen and examined. 57F with biliary colic being followed as outpt and planned for elective lap macrina. She reports the pain worsened and persisted through the weekend coming in waves prompting her to come for evaluation. No fever/chills.    VSS  WBC 7, LFTs WNL  US with 2.5cm mobile stone no significant thickening    Abdomen soft, +RUQ TTP, no rebound/guarding, neg murphys    Admit  OR tomorrow for lap macrina  NPO  IV abx  AM labs

## 2023-01-18 NOTE — ED PROVIDER NOTE - ATTENDING CONTRIBUTION TO CARE
HPI: 57-year-old female history of known gallstones, hypertension, high cholesterol, embolic stroke with fixed PFO, presenting with persistent right upper quadrant pain of last 2 days in the setting of prior history of gallstones.  States 2 days ago experienced new onset right upper quadrant pain worse with any p.o. attempt, radiating to midepigastric and LUQ, no other exacerbating features, denies any associated fever, chills, nausea, vomiting, diarrhea, constipation.  EXAM: Uncomfortable appearing, tenderness to palpation Right upper quandrant with + [place],   MDM: pt with HTN, HLD, CVA from PFO without residual deficits that presents with RUQ pain known gallstones. Follows with Dr Sosa for surgery but was told watch symptoms no surgery. Here for two days of pain and N/V, decreased appetite. Bedside US shows gallstone at neck of GB. Likely cause of her condition although will also consider GERD vs pancreatitis. Will obtain labs, US, provide IVF and meds and reassess.

## 2023-01-18 NOTE — H&P ADULT - ASSESSMENT
Ms. Kimball is a 57 F w PMHx HLD, HTN, bilary colic, and CVA 2021 without residual deficits who presents with symptomatic cholelithiasis vs acute cholecystitis. RUQ US demonstrates cholelithiasis w/o evidence of acute cholecystitis. Labs benign, no elevations in LFT or T bili and WBC wnl. However, exam w + Mendoza sign cf acute cholecystitis.     Plan:   -Admit to A Team Surgery, Dr. Higgins   -Plan for OR tomorrow: Lap Sofy, possible open   -Pain Control: Dilaudid PRN and Tylenol   -Hx HTN: not currently on anti HTN, tx PRN w labetalol/hydralazine   -Diet: NPO  -IVF  -Abx: Cipro/Flagyl  -Lovenox for Chemical VTE ppx  -SCD  -On ASA 81 outpatient given hx CVA, hold for OR    Discussed with Attending Surgeon Dr. Gisela Wang MD PGY2  A Team  [id number]

## 2023-01-18 NOTE — H&P ADULT - NSHPLABSRESULTS_GEN_ALL_CORE
11.0   7.48  )-----------( 271      ( 18 Jan 2023 07:00 )             35.6   01-18    137  |  104  |  18  ----------------------------<  103<H>  4.5   |  23  |  0.66    Ca    10.2      18 Jan 2023 07:00    TPro  7.6  /  Alb  3.5  /  TBili  0.3  /  DBili  x   /  AST  21  /  ALT  19  /  AlkPhos  89  01-18    < from: US Abdomen Upper Quadrant Right (01.18.23 @ 07:36) >      ACC: [id number] EXAM:  US ABDOMEN RT UPR QUADRANT                          PROCEDURE DATE:  01/18/2023          INTERPRETATION:  CLINICAL INFORMATION: Right upper quadrant pain. History   of gallstones.    COMPARISON: Right upper quadrant ultrasound 10/13/2021. CT abdomen and   pelvis 10/13/2021.    TECHNIQUE: Sonography of the right upper quadrant.    FINDINGS:  Liver: Within normal limits.  Bile ducts: Normal caliber. Common bile duct measures 4 mm.  Gallbladder: Cholelithiasis with a mobile 2.5 cm gallstone. No wall   thickening or pericholecystic edema. Negative sonographic Mendoza sign.  Pancreas: Not well visualized. Visualized portions are within normal   limits.  Right kidney: 10.3 cm. No hydronephrosis.  Ascites: None.  IVC: Visualized portions are within normal limits.    IMPRESSION:  Cholelithiasis without sonographic evidence of acute cholecystitis.        --- End of Report ---            CHANTALE KAN MD; Attending Radiologist  This document has been electronically signed. Jan 18 2023  8:05AM    < end of copied text >

## 2023-01-18 NOTE — ED PROVIDER NOTE - PHYSICAL EXAMINATION
Physical Exam:  General: NAD, Conversive  Eyes: EOMI, Conjunctia and sclera clear  Neck: No JVD  Lungs: Clear to auscultation bilaterally, no wheeze, no rhonchi  Heart: Normal S1, S2, no murmurs  Abdomen: Soft, exquisitely tender to palpation over RUQ, + tenderness midepigastric, LUQ, nondistended, no CVA tenderness  Extremities: 2+ peripheral pulses, no edema  Psych: AAO X3  Neurologic: Non-focal

## 2023-01-18 NOTE — PATIENT PROFILE ADULT - FALL HARM RISK - UNIVERSAL INTERVENTIONS
Bed in lowest position, wheels locked, appropriate side rails in place/Call bell, personal items and telephone in reach/Instruct patient to call for assistance before getting out of bed or chair/Non-slip footwear when patient is out of bed/[place] to call system/Physically safe environment - no spills, clutter or unnecessary equipment/Purposeful Proactive Rounding/Room/bathroom lighting operational, light cord in reach

## 2023-01-18 NOTE — ED ADULT NURSE NOTE - NSFALLRSKHARMRISK_ED_ALL_ED
Agree with above assessment.  Patient doing well.  D/C instructions discussed with the patient.  D/C home today. no

## 2023-01-18 NOTE — ED ADULT TRIAGE NOTE - CHIEF COMPLAINT QUOTE
presents C/O upper abd pain. " feels similar to gallstones" No complaints of chest pain, headache, nausea, dizziness, vomiting  SOB, fever, chills verbalized. Pmhx HLD HTN CVA 7/21

## 2023-01-18 NOTE — H&P ADULT - HISTORY OF PRESENT ILLNESS
Ms. Kimball is a 57 F w PMHx HLD, HTN, bilary colic, and CVA 2021 without residual deficits who presents with abdominal pain aw n/v since Sunday. Patient reports acute onset of nausea/vomiting and RUQ abdominal pain Sunday. Subsided somewhat Monday, however worsened Tuesday prompting visit to Dr. Smith, who she follows with for hx of biliary colic. They scheduled surgery 1/30, however pain worsened overnight Tuesday, prompting ED presentation. At present, patient reports pain in RUQ. Denies nausea, vomiting, fevers, chills, diarrhea, CP, SOB.

## 2023-01-19 ENCOUNTER — TRANSCRIPTION ENCOUNTER (OUTPATIENT)
Age: 58
End: 2023-01-19

## 2023-01-19 LAB
ANION GAP SERPL CALC-SCNC: 13 MMOL/L — SIGNIFICANT CHANGE UP (ref 7–14)
APTT BLD: 30.9 SEC — SIGNIFICANT CHANGE UP (ref 27–36.3)
BUN SERPL-MCNC: 10 MG/DL — SIGNIFICANT CHANGE UP (ref 7–23)
CALCIUM SERPL-MCNC: 9.7 MG/DL — SIGNIFICANT CHANGE UP (ref 8.4–10.5)
CHLORIDE SERPL-SCNC: 104 MMOL/L — SIGNIFICANT CHANGE UP (ref 98–107)
CO2 SERPL-SCNC: 22 MMOL/L — SIGNIFICANT CHANGE UP (ref 22–31)
CREAT SERPL-MCNC: 0.58 MG/DL — SIGNIFICANT CHANGE UP (ref 0.5–1.3)
EGFR: 106 ML/MIN/1.73M2 — SIGNIFICANT CHANGE UP
GLUCOSE SERPL-MCNC: 76 MG/DL — SIGNIFICANT CHANGE UP (ref 70–99)
HCT VFR BLD CALC: 32.7 % — LOW (ref 34.5–45)
HGB BLD-MCNC: 10.1 G/DL — LOW (ref 11.5–15.5)
INR BLD: 1.14 RATIO — SIGNIFICANT CHANGE UP (ref 0.88–1.16)
MAGNESIUM SERPL-MCNC: 1.7 MG/DL — SIGNIFICANT CHANGE UP (ref 1.6–2.6)
MCHC RBC-ENTMCNC: 25.6 PG — LOW (ref 27–34)
MCHC RBC-ENTMCNC: 30.9 GM/DL — LOW (ref 32–36)
MCV RBC AUTO: 82.8 FL — SIGNIFICANT CHANGE UP (ref 80–100)
NRBC # BLD: 0 /100 WBCS — SIGNIFICANT CHANGE UP (ref 0–0)
NRBC # FLD: 0 K/UL — SIGNIFICANT CHANGE UP (ref 0–0)
PHOSPHATE SERPL-MCNC: 4.6 MG/DL — HIGH (ref 2.5–4.5)
PLATELET # BLD AUTO: 237 K/UL — SIGNIFICANT CHANGE UP (ref 150–400)
POTASSIUM SERPL-MCNC: 3.9 MMOL/L — SIGNIFICANT CHANGE UP (ref 3.5–5.3)
POTASSIUM SERPL-SCNC: 3.9 MMOL/L — SIGNIFICANT CHANGE UP (ref 3.5–5.3)
PROTHROM AB SERPL-ACNC: 13.2 SEC — SIGNIFICANT CHANGE UP (ref 10.5–13.4)
RBC # BLD: 3.95 M/UL — SIGNIFICANT CHANGE UP (ref 3.8–5.2)
RBC # FLD: 12.9 % — SIGNIFICANT CHANGE UP (ref 10.3–14.5)
SODIUM SERPL-SCNC: 139 MMOL/L — SIGNIFICANT CHANGE UP (ref 135–145)
WBC # BLD: 4.27 K/UL — SIGNIFICANT CHANGE UP (ref 3.8–10.5)
WBC # FLD AUTO: 4.27 K/UL — SIGNIFICANT CHANGE UP (ref 3.8–10.5)

## 2023-01-19 PROCEDURE — 88304 TISSUE EXAM BY PATHOLOGIST: CPT | Mod: 26

## 2023-01-19 RX ORDER — IBUPROFEN 200 MG
1 TABLET ORAL
Qty: 0 | Refills: 0 | DISCHARGE
Start: 2023-01-19

## 2023-01-19 RX ORDER — POTASSIUM CHLORIDE 20 MEQ
10 PACKET (EA) ORAL ONCE
Refills: 0 | Status: COMPLETED | OUTPATIENT
Start: 2023-01-19 | End: 2023-01-19

## 2023-01-19 RX ORDER — ACETAMINOPHEN 500 MG
2 TABLET ORAL
Qty: 0 | Refills: 0 | DISCHARGE
Start: 2023-01-19

## 2023-01-19 RX ORDER — MAGNESIUM SULFATE 500 MG/ML
2 VIAL (ML) INJECTION ONCE
Refills: 0 | Status: COMPLETED | OUTPATIENT
Start: 2023-01-19 | End: 2023-01-19

## 2023-01-19 RX ORDER — OXYCODONE HYDROCHLORIDE 5 MG/1
5 TABLET ORAL ONCE
Refills: 0 | Status: DISCONTINUED | OUTPATIENT
Start: 2023-01-19 | End: 2023-01-19

## 2023-01-19 RX ORDER — HYDROMORPHONE HYDROCHLORIDE 2 MG/ML
0.5 INJECTION INTRAMUSCULAR; INTRAVENOUS; SUBCUTANEOUS
Refills: 0 | Status: DISCONTINUED | OUTPATIENT
Start: 2023-01-19 | End: 2023-01-19

## 2023-01-19 RX ORDER — OXYCODONE HYDROCHLORIDE 5 MG/1
5 TABLET ORAL EVERY 4 HOURS
Refills: 0 | Status: DISCONTINUED | OUTPATIENT
Start: 2023-01-19 | End: 2023-01-20

## 2023-01-19 RX ORDER — IBUPROFEN 200 MG
600 TABLET ORAL EVERY 6 HOURS
Refills: 0 | Status: DISCONTINUED | OUTPATIENT
Start: 2023-01-19 | End: 2023-01-20

## 2023-01-19 RX ORDER — FENTANYL CITRATE 50 UG/ML
25 INJECTION INTRAVENOUS
Refills: 0 | Status: DISCONTINUED | OUTPATIENT
Start: 2023-01-19 | End: 2023-01-19

## 2023-01-19 RX ORDER — ONDANSETRON 8 MG/1
4 TABLET, FILM COATED ORAL ONCE
Refills: 0 | Status: DISCONTINUED | OUTPATIENT
Start: 2023-01-19 | End: 2023-01-19

## 2023-01-19 RX ORDER — ONDANSETRON 8 MG/1
2 TABLET, FILM COATED ORAL ONCE
Refills: 0 | Status: COMPLETED | OUTPATIENT
Start: 2023-01-19 | End: 2023-01-19

## 2023-01-19 RX ORDER — OXYCODONE HYDROCHLORIDE 5 MG/1
1 TABLET ORAL
Qty: 12 | Refills: 0
Start: 2023-01-19

## 2023-01-19 RX ORDER — ACETAMINOPHEN 500 MG
650 TABLET ORAL EVERY 6 HOURS
Refills: 0 | Status: DISCONTINUED | OUTPATIENT
Start: 2023-01-19 | End: 2023-01-20

## 2023-01-19 RX ORDER — LANOLIN ALCOHOL/MO/W.PET/CERES
3 CREAM (GRAM) TOPICAL AT BEDTIME
Refills: 0 | Status: DISCONTINUED | OUTPATIENT
Start: 2023-01-19 | End: 2023-01-20

## 2023-01-19 RX ORDER — OXYCODONE HYDROCHLORIDE 5 MG/1
2.5 TABLET ORAL EVERY 4 HOURS
Refills: 0 | Status: DISCONTINUED | OUTPATIENT
Start: 2023-01-19 | End: 2023-01-20

## 2023-01-19 RX ORDER — ONDANSETRON 8 MG/1
4 TABLET, FILM COATED ORAL EVERY 6 HOURS
Refills: 0 | Status: DISCONTINUED | OUTPATIENT
Start: 2023-01-19 | End: 2023-01-20

## 2023-01-19 RX ORDER — ACETAMINOPHEN 500 MG
1000 TABLET ORAL ONCE
Refills: 0 | Status: COMPLETED | OUTPATIENT
Start: 2023-01-19 | End: 2023-01-19

## 2023-01-19 RX ADMIN — Medication 200 MILLIGRAM(S): at 07:13

## 2023-01-19 RX ADMIN — ONDANSETRON 2 MILLIGRAM(S): 8 TABLET, FILM COATED ORAL at 21:18

## 2023-01-19 RX ADMIN — ONDANSETRON 4 MILLIGRAM(S): 8 TABLET, FILM COATED ORAL at 19:47

## 2023-01-19 RX ADMIN — OXYCODONE HYDROCHLORIDE 5 MILLIGRAM(S): 5 TABLET ORAL at 15:20

## 2023-01-19 RX ADMIN — Medication 1000 MILLIGRAM(S): at 18:10

## 2023-01-19 RX ADMIN — ATORVASTATIN CALCIUM 40 MILLIGRAM(S): 80 TABLET, FILM COATED ORAL at 22:07

## 2023-01-19 RX ADMIN — Medication 400 MILLIGRAM(S): at 05:22

## 2023-01-19 RX ADMIN — Medication 600 MILLIGRAM(S): at 22:07

## 2023-01-19 RX ADMIN — Medication 100 MILLIGRAM(S): at 05:22

## 2023-01-19 RX ADMIN — Medication 25 GRAM(S): at 08:50

## 2023-01-19 RX ADMIN — Medication 100 MILLIEQUIVALENT(S): at 10:28

## 2023-01-19 RX ADMIN — OXYCODONE HYDROCHLORIDE 5 MILLIGRAM(S): 5 TABLET ORAL at 19:52

## 2023-01-19 RX ADMIN — HYDROMORPHONE HYDROCHLORIDE 0.5 MILLIGRAM(S): 2 INJECTION INTRAMUSCULAR; INTRAVENOUS; SUBCUTANEOUS at 15:20

## 2023-01-19 RX ADMIN — ENOXAPARIN SODIUM 40 MILLIGRAM(S): 100 INJECTION SUBCUTANEOUS at 22:06

## 2023-01-19 RX ADMIN — SODIUM CHLORIDE 50 MILLILITER(S): 9 INJECTION, SOLUTION INTRAVENOUS at 17:40

## 2023-01-19 RX ADMIN — HYDROMORPHONE HYDROCHLORIDE 0.5 MILLIGRAM(S): 2 INJECTION INTRAMUSCULAR; INTRAVENOUS; SUBCUTANEOUS at 15:45

## 2023-01-19 RX ADMIN — OXYCODONE HYDROCHLORIDE 5 MILLIGRAM(S): 5 TABLET ORAL at 16:00

## 2023-01-19 RX ADMIN — Medication 400 MILLIGRAM(S): at 17:41

## 2023-01-19 NOTE — DISCHARGE NOTE PROVIDER - HOSPITAL COURSE
57-year-old female history of known gallstones, hypertension, high cholesterol, embolic stroke with fixed PFO, presenting with persistent right upper quadrant pain of last 2 days in the setting of prior history of gallstones.  States 2 days ago experienced new onset right upper quadrant pain worse with any p.o. attempt, radiating to midepigastric and LUQ, no other exacerbating features, denies any associated fever, chills, nausea, vomiting, diarrhea, constipation.    On exam: Uncomfortable appearing, tenderness to palpation Right upper quandrant with + [place],   MDM: pt with HTN, HLD, CVA from PFO without residual deficits that presents with RUQ pain known gallstones. Follows with Dr Sosa for surgery but was told watch symptoms no surgery. Here for two days of pain and N/V, decreased appetite. Bedside US shows gallstone at neck of GB. Likely cause of her condition although will also consider GERD vs pancreatitis. Will obtain labs, US, provide IVF and meds and reassess.    Hospital admission Day-1: The patient was made NPO and given IVF and pain management.     Hospital admission Day-2: Morning labs were collected, patient's electrolyetes were repleted. The patient was taken for lap. cholecsytectomy, which was uneventful without complications. Patient was recovering well at PACU. Post operative check was done at ...... PM. The patient had an apporpriate pain, tolerating ....., and stable vital signs.    Upon discharge the patient was tolerating diet, ambulating, and tolerating pain. All the instructions were given to the patient. Patient shall follow up after 2-weeks.   57-year-old female history of known gallstones, hypertension, high cholesterol, embolic stroke with fixed PFO, presenting with persistent right upper quadrant pain of last 2 days in the setting of prior history of gallstones.  States 2 days ago experienced new onset right upper quadrant pain worse with any p.o. attempt, radiating to midepigastric and LUQ, no other exacerbating features, denies any associated fever, chills, nausea, vomiting, diarrhea, constipation.    On exam: Uncomfortable appearing, tenderness to palpation Right upper quandrant with + [place],   MDM: pt with HTN, HLD, CVA from PFO without residual deficits that presents with RUQ pain known gallstones. Follows with Dr Sosa for surgery but was told watch symptoms no surgery. Here for two days of pain and N/V, decreased appetite. Bedside US shows gallstone at neck of GB. Likely cause of her condition although will also consider GERD vs pancreatitis. Will obtain labs, US, provide IVF and meds and reassess.    Hospital admission Day-1: The patient was made NPO and given IVF and pain management.     Hospital admission Day-2: Morning labs were collected, patient's electrolyetes were repleted. The patient was taken for lap. cholecsytectomy, which was uneventful without complications. Patient was recovering well at PACU. During hospital course patients diet was slowly advanced as tolerated.    At this time, pt is tolerating a regular diet, ambulating and voiding. Pt has been deemed stable for discharge at this time.

## 2023-01-19 NOTE — DISCHARGE NOTE PROVIDER - NSDCMRMEDTOKEN_GEN_ALL_CORE_FT
aspirin 81 mg oral delayed release tablet: 1 tab(s) orally once a day  atorvastatin 80 mg oral tablet: 1 tab(s) orally once a day (at bedtime)   acetaminophen 325 mg oral tablet: 2 tab(s) orally every 6 hours  aspirin 81 mg oral delayed release tablet: 1 tab(s) orally once a day  atorvastatin 80 mg oral tablet: 1 tab(s) orally once a day (at bedtime)  ibuprofen 600 mg oral tablet: 1 tab(s) orally every 6 hours   acetaminophen 325 mg oral tablet: 2 tab(s) orally every 6 hours  aspirin 81 mg oral delayed release tablet: 1 tab(s) orally once a day  atorvastatin 80 mg oral tablet: 1 tab(s) orally once a day (at bedtime)  ibuprofen 600 mg oral tablet: 1 tab(s) orally every 6 hours  oxyCODONE 5 mg oral tablet: 1 tab(s) orally every 6 hours MDD:4 tabs   acetaminophen 325 mg oral tablet: 2 tab(s) orally every 6 hours  aspirin 81 mg oral delayed release tablet: 1 tab(s) orally once a day  atorvastatin 80 mg oral tablet: 1 tab(s) orally once a day (at bedtime)  ibuprofen 400 mg oral tablet: 1 tab(s) orally every 6 hours MDD:4  oxyCODONE 5 mg oral tablet: 1 tab(s) orally every 6 hours MDD:4 tabs

## 2023-01-19 NOTE — DISCHARGE NOTE PROVIDER - CARE PROVIDER_API CALL
Denver Higgins (DO)  Surgery  [address] [facility] [address]  [place] [address]  Phone: [phone]  Fax: [phone]  Follow Up Time: 1 week

## 2023-01-19 NOTE — DISCHARGE NOTE PROVIDER - NSDCCPCAREPLAN_GEN_ALL_CORE_FT
PRINCIPAL DISCHARGE DIAGNOSIS  Diagnosis: Gallstones  Assessment and Plan of Treatment:        PRINCIPAL DISCHARGE DIAGNOSIS  Diagnosis: Gallstones  Assessment and Plan of Treatment: WOUND CARE:  Please keep incisions clean and dry. Please do not Scrub or rub incisions. Do not use lotion or powder on incisions.   BATHING: You may shower and/or sponge bathe. You may use warm soapy water in the shower and rinse, pat dry.  ACTIVITY: No heavy lifting or straining. Otherwise, you may return to your usual level of physical activity. If you are taking narcotic pain medication DO NOT drive a car, operate machinery or make important decisions.  DIET: Return to your usual diet.  NOTIFY YOUR SURGEON IF: You have any bleeding that does not stop, any pus draining from your wound(s), increased pain at surgical site, any fever (over 100.4 F) persistent nausea/vomiting, or if your pain is not controlled on your discharge pain medications.  Please follow up with your primary care physician in 1-2 weeks regarding your hospitalization.  Please follow up with your surgeon, Dr. Higgins in 1-2 weeks. Please call office to make an appointment.

## 2023-01-19 NOTE — DISCHARGE NOTE PROVIDER - NSDCFUSCHEDAPPT_GEN_ALL_CORE_FT
Vishal Smith  [facility]  [facility]  Scheduled Appointment: 01/29/2023    Vishal Smith  [facility]  [facility]  Scheduled Appointment: 01/30/2023

## 2023-01-19 NOTE — DISCHARGE NOTE PROVIDER - NSRESEARCHGRANT_PROPHYLAXISRECOMFT_GEN_A_CORE
IMPROVE-DD Application Not Available This is a surgical and/or non-medical patient.; IMPROVE-DD Application Not Available This is a surgical and/or non-medical patient.

## 2023-01-19 NOTE — DISCHARGE NOTE PROVIDER - NSRESEARCHGRANT_OVERRIDEREC_GEN_A_CORE
IMPROVE-DD Application Not Available This is a surgical and/or non-medical patient./IMPROVE-DD Application Not Available This is a surgical and/or non-medical patient.

## 2023-01-19 NOTE — BRIEF OPERATIVE NOTE - OPERATION/FINDINGS
Critical view obtained. Cystic duct and artery clipped and cut. Gallbladder taken off liver bed using electrocautery. Hemostasis achieved. Abdomen irrigated copiously and suctioned.

## 2023-01-19 NOTE — PROGRESS NOTE ADULT - SUBJECTIVE AND OBJECTIVE BOX
TEAM [ A ] Surgery Daily Progress Note  =====================================================  SUBJECTIVE: No acute events overnight. Patient seen and examined at bedside on AM rounds. Patient reports that they're feeling well. NPO, denies nausea, vomiting.         ALLERGIES:  sulfa drugs (Rash; Urticaria)  --------------------------------------------------------------------------------------    MEDICATIONS:    Neurologic Medications  acetaminophen   IVPB .. 1000 milliGRAM(s) IV Intermittent every 6 hours  HYDROmorphone  Injectable 0.5 milliGRAM(s) IV Push every 6 hours PRN Severe Pain (7 - 10)  HYDROmorphone  Injectable 0.25 milliGRAM(s) IV Push every 6 hours PRN Moderate Pain (4 - 6)    Respiratory Medications    Cardiovascular Medications    Gastrointestinal Medications  lactated ringers. 1000 milliLiter(s) IV Continuous <Continuous>    Genitourinary Medications    Hematologic/Oncologic Medications  enoxaparin Injectable 40 milliGRAM(s) SubCutaneous every 24 hours  influenza   Vaccine 0.5 milliLiter(s) IntraMuscular once    Antimicrobial/Immunologic Medications  ciprofloxacin   IVPB 400 milliGRAM(s) IV Intermittent every 12 hours  ciprofloxacin   IVPB      metroNIDAZOLE  IVPB 500 milliGRAM(s) IV Intermittent every 8 hours  metroNIDAZOLE  IVPB        Endocrine/Metabolic Medications  atorvastatin 40 milliGRAM(s) Oral at bedtime    Topical/Other Medications    --------------------------------------------------------------------------------------    VITAL SIGNS:    Vital Signs Last 24 Hrs  T(C): 36.7 (19 Jan 2023 05:40), Max: 36.9 (18 Jan 2023 15:40)  T(F): 98.1 (19 Jan 2023 05:40), Max: 98.4 (18 Jan 2023 15:40)  HR: 88 (19 Jan 2023 05:40) (88 - 100)  BP: 143/60 (19 Jan 2023 05:40) (108/51 - 143/86)  BP(mean): --  RR: 18 (19 Jan 2023 05:40) (16 - 18)  SpO2: 98% (19 Jan 2023 05:40) (95% - 100%)    Parameters below as of 19 Jan 2023 05:40  Patient On (Oxygen Delivery Method): room air      --------------------------------------------------------------------------------------    EXAM    General: NAD, resting in bed comfortably.  Cardiac: regular rate, warm and well perfused  Respiratory: Nonlabored respirations, normal cw expansion.  Abdomen: soft, mildly tender in RUQ, nondistended.   Extremities: normal strength, FROM, no deformities    --------------------------------------------------------------------------------------      LABS    .  LABS:                         10.1   4.27  )-----------( 237      ( 19 Jan 2023 07:10 )             32.7     01-18    137  |  104  |  18  ----------------------------<  103<H>  4.5   |  23  |  0.66    Ca    10.2      18 Jan 2023 07:00    TPro  7.6  /  Alb  3.5  /  TBili  0.3  /  DBili  x   /  AST  21  /  ALT  19  /  AlkPhos  89  01-18    PT/INR - ( 18 Jan 2023 07:00 )   PT: 12.8 sec;   INR: 1.10 ratio         PTT - ( 18 Jan 2023 07:00 )  PTT:23.4 sec          RADIOLOGY, EKG & ADDITIONAL TESTS: Reviewed.

## 2023-01-20 ENCOUNTER — TRANSCRIPTION ENCOUNTER (OUTPATIENT)
Age: 58
End: 2023-01-20

## 2023-01-20 VITALS
DIASTOLIC BLOOD PRESSURE: 63 MMHG | SYSTOLIC BLOOD PRESSURE: 134 MMHG | TEMPERATURE: 99 F | HEART RATE: 80 BPM | OXYGEN SATURATION: 98 % | RESPIRATION RATE: 17 BRPM

## 2023-01-20 LAB
ANION GAP SERPL CALC-SCNC: 10 MMOL/L — SIGNIFICANT CHANGE UP (ref 7–14)
BUN SERPL-MCNC: 19 MG/DL — SIGNIFICANT CHANGE UP (ref 7–23)
CALCIUM SERPL-MCNC: 9.3 MG/DL — SIGNIFICANT CHANGE UP (ref 8.4–10.5)
CHLORIDE SERPL-SCNC: 102 MMOL/L — SIGNIFICANT CHANGE UP (ref 98–107)
CO2 SERPL-SCNC: 24 MMOL/L — SIGNIFICANT CHANGE UP (ref 22–31)
CREAT SERPL-MCNC: 0.65 MG/DL — SIGNIFICANT CHANGE UP (ref 0.5–1.3)
EGFR: 103 ML/MIN/1.73M2 — SIGNIFICANT CHANGE UP
GLUCOSE SERPL-MCNC: 113 MG/DL — HIGH (ref 70–99)
HCT VFR BLD CALC: 31.3 % — LOW (ref 34.5–45)
HGB BLD-MCNC: 9.7 G/DL — LOW (ref 11.5–15.5)
MAGNESIUM SERPL-MCNC: 1.8 MG/DL — SIGNIFICANT CHANGE UP (ref 1.6–2.6)
MCHC RBC-ENTMCNC: 25.3 PG — LOW (ref 27–34)
MCHC RBC-ENTMCNC: 31 GM/DL — LOW (ref 32–36)
MCV RBC AUTO: 81.7 FL — SIGNIFICANT CHANGE UP (ref 80–100)
NRBC # BLD: 0 /100 WBCS — SIGNIFICANT CHANGE UP (ref 0–0)
NRBC # FLD: 0 K/UL — SIGNIFICANT CHANGE UP (ref 0–0)
PHOSPHATE SERPL-MCNC: 3.7 MG/DL — SIGNIFICANT CHANGE UP (ref 2.5–4.5)
PLATELET # BLD AUTO: 265 K/UL — SIGNIFICANT CHANGE UP (ref 150–400)
POTASSIUM SERPL-MCNC: 4.8 MMOL/L — SIGNIFICANT CHANGE UP (ref 3.5–5.3)
POTASSIUM SERPL-SCNC: 4.8 MMOL/L — SIGNIFICANT CHANGE UP (ref 3.5–5.3)
RBC # BLD: 3.83 M/UL — SIGNIFICANT CHANGE UP (ref 3.8–5.2)
RBC # FLD: 12.5 % — SIGNIFICANT CHANGE UP (ref 10.3–14.5)
SODIUM SERPL-SCNC: 136 MMOL/L — SIGNIFICANT CHANGE UP (ref 135–145)
WBC # BLD: 6.46 K/UL — SIGNIFICANT CHANGE UP (ref 3.8–10.5)
WBC # FLD AUTO: 6.46 K/UL — SIGNIFICANT CHANGE UP (ref 3.8–10.5)

## 2023-01-20 RX ORDER — KETOROLAC TROMETHAMINE 30 MG/ML
30 SYRINGE (ML) INJECTION ONCE
Refills: 0 | Status: DISCONTINUED | OUTPATIENT
Start: 2023-01-20 | End: 2023-01-20

## 2023-01-20 RX ORDER — KETOROLAC TROMETHAMINE 30 MG/ML
15 SYRINGE (ML) INJECTION ONCE
Refills: 0 | Status: DISCONTINUED | OUTPATIENT
Start: 2023-01-20 | End: 2023-01-20

## 2023-01-20 RX ORDER — OXYCODONE HYDROCHLORIDE 5 MG/1
1 TABLET ORAL
Qty: 12 | Refills: 0
Start: 2023-01-20

## 2023-01-20 RX ORDER — FAMOTIDINE 10 MG/ML
20 INJECTION INTRAVENOUS ONCE
Refills: 0 | Status: COMPLETED | OUTPATIENT
Start: 2023-01-20 | End: 2023-01-20

## 2023-01-20 RX ORDER — IBUPROFEN 200 MG
1 TABLET ORAL
Qty: 12 | Refills: 0
Start: 2023-01-20 | End: 2023-01-22

## 2023-01-20 RX ORDER — PANTOPRAZOLE SODIUM 20 MG/1
40 TABLET, DELAYED RELEASE ORAL ONCE
Refills: 0 | Status: COMPLETED | OUTPATIENT
Start: 2023-01-20 | End: 2023-01-20

## 2023-01-20 RX ADMIN — Medication 650 MILLIGRAM(S): at 06:14

## 2023-01-20 RX ADMIN — Medication 650 MILLIGRAM(S): at 12:27

## 2023-01-20 RX ADMIN — Medication 30 MILLIGRAM(S): at 09:00

## 2023-01-20 RX ADMIN — Medication 650 MILLIGRAM(S): at 00:43

## 2023-01-20 RX ADMIN — Medication 15 MILLIGRAM(S): at 14:57

## 2023-01-20 RX ADMIN — PANTOPRAZOLE SODIUM 40 MILLIGRAM(S): 20 TABLET, DELAYED RELEASE ORAL at 09:00

## 2023-01-20 RX ADMIN — ONDANSETRON 4 MILLIGRAM(S): 8 TABLET, FILM COATED ORAL at 07:13

## 2023-01-20 RX ADMIN — OXYCODONE HYDROCHLORIDE 5 MILLIGRAM(S): 5 TABLET ORAL at 05:08

## 2023-01-20 RX ADMIN — OXYCODONE HYDROCHLORIDE 5 MILLIGRAM(S): 5 TABLET ORAL at 06:08

## 2023-01-20 NOTE — DISCHARGE NOTE NURSING/CASE MANAGEMENT/SOCIAL WORK - NSDPDISTO_GEN_ALL_CORE
Pt. is afebrile and offers no complaints. In no acute distress. Abdominal scope sites: clean, dry and intact. Pt is ambulating,, tolerating diet well, and voiding in adequate amounts./Home

## 2023-01-20 NOTE — PROGRESS NOTE ADULT - ASSESSMENT
Assessment:  57 year old female s/p acute cholecystectomy    Plan:  -Regular diet   -pain control PRN   -OOB/ambulate   - DVT PPx : Lovenox  -DISPO: home today     A-Team  [id number]
Assessment:  A 57 year old female presented with RUQ pain. Patient is scheduled for acute cholecystectomy today.     Plan:  - Diet: NPO  - IVF  - DVT PPx : Lovenox  - Activity as tolerated  - Pain management PRN  - OR today for acute cholecystectomy     A-Team  [id number]

## 2023-01-20 NOTE — DISCHARGE NOTE NURSING/CASE MANAGEMENT/SOCIAL WORK - PATIENT PORTAL LINK FT
You can access the FollowMyHealth Patient Portal offered by [facility] by registering at the following website: [email or web address]. By joining [employer]’s FollowMyHealth portal, you will also be able to view your health information using other applications (apps) compatible with our system.

## 2023-01-20 NOTE — DISCHARGE NOTE NURSING/CASE MANAGEMENT/SOCIAL WORK - NSDCPNINST_GEN_ALL_CORE
Make a follow up appointment with Dr. Higgins. Call MD if you develop a fever, or if there is redness, swelling, drainage or pain not relieved by pain medication. No heavy lifting, bending, or straining to move your bowels. Take over the counter stool softeners as needed to prevent constipation which may be caused by pain medication.

## 2023-01-20 NOTE — PROGRESS NOTE ADULT - SUBJECTIVE AND OBJECTIVE BOX
TEAM [ A ] Surgery Daily Progress Note  =====================================================  SUBJECTIVE:  Pt seen and examined on AM rounds. Pt s/p saniya schrader, had some nausea post op. Reports some pain this AM. Denies N/V, fever, chills, SOB, chest pain.     ALLERGIES:  sulfa drugs (Rash; Urticaria)  --------------------------------------------------------------------------------------    MEDICATIONS:    Neurologic Medications  acetaminophen   IVPB .. 1000 milliGRAM(s) IV Intermittent every 6 hours  HYDROmorphone  Injectable 0.5 milliGRAM(s) IV Push every 6 hours PRN Severe Pain (7 - 10)  HYDROmorphone  Injectable 0.25 milliGRAM(s) IV Push every 6 hours PRN Moderate Pain (4 - 6)    Respiratory Medications    Cardiovascular Medications    Gastrointestinal Medications  lactated ringers. 1000 milliLiter(s) IV Continuous <Continuous>    Genitourinary Medications    Hematologic/Oncologic Medications  enoxaparin Injectable 40 milliGRAM(s) SubCutaneous every 24 hours  influenza   Vaccine 0.5 milliLiter(s) IntraMuscular once    Antimicrobial/Immunologic Medications  ciprofloxacin   IVPB 400 milliGRAM(s) IV Intermittent every 12 hours  ciprofloxacin   IVPB      metroNIDAZOLE  IVPB 500 milliGRAM(s) IV Intermittent every 8 hours  metroNIDAZOLE  IVPB        Endocrine/Metabolic Medications  atorvastatin 40 milliGRAM(s) Oral at bedtime    Topical/Other Medications    --------------------------------------------------------------------------------------    VITAL SIGNS:    Vital Signs Last 24 Hrs  T(C): 36.5 (01-20-23 @ 05:12), Max: 37 (01-19-23 @ 14:55)  HR: 93 (01-20-23 @ 05:12) (83 - 99)  BP: 146/67 (01-20-23 @ 05:12) (111/93 - 152/64)  ABP: --  ABP(mean): --  RR: 18 (01-20-23 @ 05:12) (16 - 22)  SpO2: 95% (01-20-23 @ 05:12) (92% - 98%)  Wt(kg): --  CVP(mm Hg): --      01-19 @ 07:01  -  01-20 @ 07:00  --------------------------------------------------------  IN:  Total IN: 0 mL    OUT:    Voided (mL): 650 mL  Total OUT: 650 mL    Total NET: -650 mL    --------------------------------------------------------------------------------------    EXAM:  General: A&Ox3, NAD  Respiratory:  unlabored breathing  CVS: Regular rate and rhythm  Abdomen: Soft, non-distended, appropriately tender   Extremities: Warm bilaterally  MSK: Intact ROM.  --------------------------------------------------------------------------------------      LABS  CBC (01-19 @ 07:10)                              10.1<L>                         4.27    )----------------(  237        --    % Neutrophils, --    % Lymphocytes, ANC: --                                  32.7<L>    BMP (01-19 @ 07:10)             139     |  104     |  10    		Ca++ --      Ca 9.7                ---------------------------------( 76    		Mg 1.70               3.9     |  22      |  0.58  			Ph 4.6<H>      Coags (01-19 @ 07:10)  aPTT 30.9 / INR 1.14 / PT 13.2

## 2023-01-20 NOTE — PROGRESS NOTE ADULT - ATTENDING COMMENTS
Pt seen/examined, c/o vomit and heart burning. Abd soft, NT, mildly tender to marnie-incisional palpation    - Protonix  - pain control  - Zofran PRN  - ambulate  - poss dc later if resolved N/V and tolerating diet

## 2023-01-27 NOTE — DISCHARGE NOTE PROVIDER - NSDCHC_MEDRECSTATUS_GEN_ALL_CORE
CC:  Stacy Alvarez is here today for   Chief Complaint   Patient presents with   • Office Visit   • New Patient     Bilateral carpal tunnel        PCP Dayami Carmichael NP   Medications: medications verified and updated  denies known Latex allergy or symptoms of Latex sensitivity.  Tobacco history: verified    Preferred pharmacy verified:    [id number] - [place] [address] AT [place] [address]  [facility] [address]  Phone: [phone] Fax: [phone]      REVIEW OF SYSTEMS:  Gastrointestinal: none  Genitourinary:none  Nervous: none  Cardiac: none  Respiratory:none  Integumentary: none  Allergies: See allergy list  Hematologic:  none   Admission Reconciliation is Not Complete  Discharge Reconciliation is Not Complete Admission Reconciliation is Completed  Discharge Reconciliation is Completed

## 2023-01-30 LAB — SURGICAL PATHOLOGY STUDY: SIGNIFICANT CHANGE UP

## 2023-04-19 ENCOUNTER — APPOINTMENT (OUTPATIENT)
Dept: NEUROLOGY | Facility: CLINIC | Age: 58
End: 2023-04-19

## 2023-12-19 ENCOUNTER — APPOINTMENT (OUTPATIENT)
Dept: NEUROLOGY | Facility: CLINIC | Age: 58
End: 2023-12-19

## 2023-12-28 ENCOUNTER — APPOINTMENT (OUTPATIENT)
Dept: NEUROLOGY | Facility: CLINIC | Age: 58
End: 2023-12-28

## 2024-05-24 NOTE — DIETITIAN INITIAL EVALUATION ADULT. - WEIGHT CHANGE
Left message for patient to relay below message from Dr. Bloch:    Michael J Bloch, M.D.  Shelley Saldana, MIR.P.RViktorN.; Almaz Marcus R.N.; Aminah Moon, [facility] Ass't  FM - that's cool  BC - pls update mala as per my note - repeat echo in 2 years  Ko - pls let patient know that echo looks fine. Aneurysm is small and stable      Gave patient office line to call back with any questions or concerns.       Aminah Moon, Medical Assistant   [facility]   Ph: [phone]  Fx: [phone]    
yes

## 2024-10-07 NOTE — DISCHARGE NOTE NURSING/CASE MANAGEMENT/SOCIAL WORK - NSDCPEPTCAREGIVEDUMATLIST _GEN_ALL_CORE
Stroke Hpi Title: Evaluation of Skin Lesions Additional History: -\\nNew pt here for full body skin check\\nPt adopted so unaware of hx\\nSpots of concern:\\n- irritated mole on left posterior shoulder

## 2025-01-16 NOTE — ED ADULT TRIAGE NOTE - STATUS:
- Fluids: LR at 75cc  - Electrolytes: Will replete to maintain K>4, Phos>3, and Mag>2  - Nutrition:  Consistent Carb  - Activity: As tolerated, pending PT eval  - DVT Prophylaxis: heparin Sub q  - Stress Ulcer/GI Prophylaxis: N/A  - Disposition: Admit to medicine, active, family actively looking for [facility] placement, spoke with Dorinda (daughter) 1/14 Intact - Fluids: LR at 75cc  - Electrolytes: Will replete to maintain K>4, Phos>3, and Mag>2  - Nutrition:  Consistent Carb  - Activity: As tolerated, pending PT eval  - DVT Prophylaxis: heparin Sub q  - Stress Ulcer/GI Prophylaxis: N/A  - Disposition: DC to [facility] today
